# Patient Record
Sex: MALE | Race: WHITE | Employment: FULL TIME | URBAN - METROPOLITAN AREA
[De-identification: names, ages, dates, MRNs, and addresses within clinical notes are randomized per-mention and may not be internally consistent; named-entity substitution may affect disease eponyms.]

---

## 2018-08-09 ENCOUNTER — HOSPITAL ENCOUNTER (OUTPATIENT)
Dept: PREADMISSION TESTING | Age: 43
Discharge: HOME | End: 2018-08-09
Payer: COMMERCIAL

## 2018-08-09 VITALS
HEIGHT: 73 IN | WEIGHT: 210 LBS | BODY MASS INDEX: 27.83 KG/M2 | DIASTOLIC BLOOD PRESSURE: 83 MMHG | TEMPERATURE: 98 F | SYSTOLIC BLOOD PRESSURE: 154 MMHG | HEART RATE: 61 BPM

## 2018-08-09 LAB
ABO + RH BLD: NORMAL
ANION GAP SERPL CALC-SCNC: 9 MMOL/L (ref 5–15)
APPEARANCE UR: CLEAR
BACTERIA URNS QL MICRO: NEGATIVE /HPF
BILIRUB UR QL: NEGATIVE
BLOOD GROUP ANTIBODIES SERPL: NORMAL
BUN SERPL-MCNC: 15 MG/DL (ref 6–20)
BUN/CREAT SERPL: 16 (ref 12–20)
CALCIUM SERPL-MCNC: 8.6 MG/DL (ref 8.5–10.1)
CHLORIDE SERPL-SCNC: 106 MMOL/L (ref 97–108)
CO2 SERPL-SCNC: 27 MMOL/L (ref 21–32)
COLOR UR: NORMAL
CREAT SERPL-MCNC: 0.93 MG/DL (ref 0.7–1.3)
EPITH CASTS URNS QL MICRO: NORMAL /LPF
ERYTHROCYTE [DISTWIDTH] IN BLOOD BY AUTOMATED COUNT: 12.9 % (ref 11.5–14.5)
EST. AVERAGE GLUCOSE BLD GHB EST-MCNC: NORMAL MG/DL
GLUCOSE SERPL-MCNC: 62 MG/DL (ref 65–100)
GLUCOSE UR STRIP.AUTO-MCNC: NEGATIVE MG/DL
HBA1C MFR BLD: 4.8 % (ref 4.2–6.3)
HCT VFR BLD AUTO: 42.4 % (ref 36.6–50.3)
HGB BLD-MCNC: 14.2 G/DL (ref 12.1–17)
HGB UR QL STRIP: NEGATIVE
HYALINE CASTS URNS QL MICRO: NORMAL /LPF (ref 0–5)
INR PPP: 1 (ref 0.9–1.1)
KETONES UR QL STRIP.AUTO: NEGATIVE MG/DL
LEUKOCYTE ESTERASE UR QL STRIP.AUTO: NEGATIVE
MCH RBC QN AUTO: 32.5 PG (ref 26–34)
MCHC RBC AUTO-ENTMCNC: 33.5 G/DL (ref 30–36.5)
MCV RBC AUTO: 97 FL (ref 80–99)
NITRITE UR QL STRIP.AUTO: NEGATIVE
NRBC # BLD: 0 K/UL (ref 0–0.01)
NRBC BLD-RTO: 0 PER 100 WBC
PH UR STRIP: 6.5 [PH] (ref 5–8)
PLATELET # BLD AUTO: 205 K/UL (ref 150–400)
PMV BLD AUTO: 10.1 FL (ref 8.9–12.9)
POTASSIUM SERPL-SCNC: 4.3 MMOL/L (ref 3.5–5.1)
PROT UR STRIP-MCNC: NEGATIVE MG/DL
PROTHROMBIN TIME: 10.4 SEC (ref 9–11.1)
RBC # BLD AUTO: 4.37 M/UL (ref 4.1–5.7)
RBC #/AREA URNS HPF: NORMAL /HPF (ref 0–5)
SODIUM SERPL-SCNC: 142 MMOL/L (ref 136–145)
SP GR UR REFRACTOMETRY: 1.01 (ref 1–1.03)
SPECIMEN EXP DATE BLD: NORMAL
UA: UC IF INDICATED,UAUC: NORMAL
UROBILINOGEN UR QL STRIP.AUTO: 0.2 EU/DL (ref 0.2–1)
WBC # BLD AUTO: 6.1 K/UL (ref 4.1–11.1)
WBC URNS QL MICRO: NORMAL /HPF (ref 0–4)

## 2018-08-09 PROCEDURE — 85610 PROTHROMBIN TIME: CPT | Performed by: ORTHOPAEDIC SURGERY

## 2018-08-09 PROCEDURE — 85027 COMPLETE CBC AUTOMATED: CPT | Performed by: ORTHOPAEDIC SURGERY

## 2018-08-09 PROCEDURE — 86900 BLOOD TYPING SEROLOGIC ABO: CPT | Performed by: ORTHOPAEDIC SURGERY

## 2018-08-09 PROCEDURE — 81001 URINALYSIS AUTO W/SCOPE: CPT | Performed by: ORTHOPAEDIC SURGERY

## 2018-08-09 PROCEDURE — 36415 COLL VENOUS BLD VENIPUNCTURE: CPT | Performed by: ORTHOPAEDIC SURGERY

## 2018-08-09 PROCEDURE — 80048 BASIC METABOLIC PNL TOTAL CA: CPT | Performed by: ORTHOPAEDIC SURGERY

## 2018-08-09 PROCEDURE — 83036 HEMOGLOBIN GLYCOSYLATED A1C: CPT | Performed by: ORTHOPAEDIC SURGERY

## 2018-08-09 RX ORDER — IBUPROFEN 200 MG
600 TABLET ORAL
COMMUNITY
End: 2018-08-30

## 2018-08-09 RX ORDER — SALICYLIC ACID
1 POWDER (GRAM) MISCELLANEOUS
COMMUNITY

## 2018-08-09 RX ORDER — NAPROXEN SODIUM 220 MG
440 TABLET ORAL 2 TIMES DAILY WITH MEALS
COMMUNITY
End: 2018-08-30

## 2018-08-09 NOTE — PERIOP NOTES
PREOPERATIVE INSTRUCTIONS REVIEWED WITH PATIENT. PATIENT GIVEN SIX PACK OF CHG WIPES. INSTRUCTIONS TO BE REVIEWED  IN CLASS] ON USE OF CHG WIPES. PATIENT GIVEN SSI INFECTION FAQ SHEET, INFORMATION SHEET ON DIABETIC TREATMENT CENTER AS WELL AS HAND WASHING TIPS SHEETS. MRSA/MSSA TREATMENT INSTRUCTION SHEET GIVEN WITH AN EXPLANATION TO PATIENT THAT THEY WILL BE NOTIFIED IF TREATMENT INSTRUCTIONS NEED TO BE INITIATED. PATIENT WAS GIVEN THE OPPORTUNITY TO ASK QUESTIONS ON THE INFORMATION PROVIDED.

## 2018-08-10 LAB
BACTERIA SPEC CULT: ABNORMAL
BACTERIA SPEC CULT: ABNORMAL
SERVICE CMNT-IMP: ABNORMAL

## 2018-08-14 PROBLEM — Z22.321 MSSA (METHICILLIN-SUSCEPTIBLE STAPH AUREUS) CARRIER: Status: ACTIVE | Noted: 2018-08-14

## 2018-08-14 RX ORDER — MUPIROCIN 20 MG/G
OINTMENT TOPICAL 2 TIMES DAILY
Qty: 22 G | Refills: 0 | Status: SHIPPED | OUTPATIENT
Start: 2018-08-21 | End: 2018-08-30

## 2018-08-23 NOTE — H&P
Niranjan Pena  Location: [place] [address] [address]  Patient #: [id number]  : 1975   / Language: English / Race: White  Male      History of Present Illness   The patient is a 38year old male who presents to the practice today for a transition into care. Additional reasons for visit:    Hip Pain is described as the following: The onset of the hip pain has been gradual and has been occurring in an intermittent pattern for 5 years. The course has been gradually worsening. The hip pain is described as a moderate dull aching. The hip pain is described as being located in the hip (entire left hip). Note for \"Hip Pain\": Patient's chief complaint is left hip pain. 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 He is now noticing increasing pain at night. Worsening of his symptoms. Allergies   Penicillin VK *PENICILLINS*      Family History   Severe allergy   Father, Son. Social History   Alcohol use   1-2 drinks per occasion, 2 times, Drinks beer, per week, Rarely drinks more than 5 drinks per occasion. Caffeine use   3-4 drinks per day, Coffee. Current work status   Full-time. Exercise   cycling, Occasionally, Other. Marital status   . No drug use    Seat Belt Use   Always uses seat belts. Sun Exposure   Occasionally. Tobacco use   Never smoker. Medication History   No Current Medications   Medications Reconciled     Past Surgical History  Hip Fracture And Surgery   left  Vasectomy      Other Problems   Arthritis          Review of Systems  General Not Present- Appetite Loss, Chills, Fatigue, Fever, HIV Exposure, Night Sweats, Persistent Infections, Seasonal Allergies, Weight Gain and Weight Loss. Skin Not Present- Itching, Nail Changes, Poor Wound Healing, Rash, Skin Color Changes, Suspicious Lesions and Yellowish Skin Color.   HEENT Not Present- Decreased Hearing, Double Vision, Earache, Hoarseness, Jaundice/Yellow Eyes, Loose Teeth, Nose Bleed, Ringing in the Ears and Sore Throat. Respiratory Not Present- Bloody sputum, Chronic Cough, Difficulty Breathing, Snoring, Wakes up from Sleep Wheezing or Short of Breath and Wheezing. Cardiovascular Not Present- Bluish Discoloration Of Lips Or Nails, Chest Pain, Difficulty Breathing Lying Down, Difficulty Breathing On Exertion, Leg Cramps With Exertion, Palpitations and Swelling of Extremities. Gastrointestinal Not Present- Abdominal Pain, Black, Tarry Stool, Change in Bowel Habits, Cirrhosis, Constipation, Diarrhea, Difficulty Swallowing, Nausea and Vomiting. Male Genitourinary Not Present- Blood in Urine, Frequency, Painful Urination, Pelvic Pain, Trouble Starting Urinary Stream and Urgency. Musculoskeletal Present- Joint Pain. Not Present- Back Pain, Joint Stiffness and Joint Swelling. Neurological Not Present- Fainting, Headaches, Memory Loss, Numbness, Seizures, Tingling, Tremor, Unsteadiness and Weakness. Psychiatric Not Present- Anxiety, Bipolar and Depression. Endocrine Not Present- Cold Intolerance, Excessive Hunger, Excessive Thirst, Excessive Urination and Heat Intolerance. Hematology Not Present- Abnormal Bruising , Enlarged Lymph Nodes, Excessive bleeding and Skin Discoloration. Vitals   Weight: 215 lb   Height: 73 in   Body Surface Area: 2.22 m²   Body Mass Index: 28.37 kg/m²                Physical Exam   Musculoskeletal  Global Assessment  Examination of related systems reveals - well-developed, well-nourished, in no acute distress, alert and oriented x 3, no rashes or ulcers of bilateral upper and lower extremities, head or trunk and no generalized swelling or edema of extremities. Gait and Station - normal posture. Left Lower Extremity - Note: Patient walks with his left lower extremity externally rotated significantly compared to the contralateral side. His left hip is very stiff. 5° flexion contracture. Flexes to about 95°.         Assessment & Plan  Primary osteoarthritis of left hip (715.15 M16.12)  Impression: End-stage DJD left hip secondary to the previous slipped capital femoral epiphysis. Hardware is removed. Discussed everything in detail. He's had significant progression since his last office visit. I treated him in known him for at least 6 years. He is a candidate for hip replacement based on the severity of his symptoms and radiographs. Current Plans  Pt Education - How to access health information online: discussed with patient and provided information. Pt Education - Educational materials were provided.: discussed with patient and provided information. X-RAY EXAM OF HIP COMPLETE min 2 VIEWS ([id number]) (2 views left hip. End-stage DJD left hip. Bone to bone.  Screws are normal. External rotation contracture.)  REVIEW OF SYSTEMS: Systems were reviewed by the provider.(V49.9)        Signed by Sary Hughes MD

## 2018-08-27 ENCOUNTER — ANESTHESIA EVENT (OUTPATIENT)
Dept: SURGERY | Age: 43
DRG: 470 | End: 2018-08-27
Payer: COMMERCIAL

## 2018-08-28 ENCOUNTER — HOSPITAL ENCOUNTER (OUTPATIENT)
Age: 43
Setting detail: OBSERVATION
Discharge: HOME HEALTH | DRG: 470 | End: 2018-08-30
Attending: ORTHOPAEDIC SURGERY | Admitting: ORTHOPAEDIC SURGERY
Payer: COMMERCIAL

## 2018-08-28 ENCOUNTER — APPOINTMENT (OUTPATIENT)
Dept: GENERAL RADIOLOGY | Age: 43
DRG: 470 | End: 2018-08-28
Attending: PHYSICIAN ASSISTANT
Payer: COMMERCIAL

## 2018-08-28 ENCOUNTER — ANESTHESIA (OUTPATIENT)
Dept: SURGERY | Age: 43
DRG: 470 | End: 2018-08-28
Payer: COMMERCIAL

## 2018-08-28 ENCOUNTER — APPOINTMENT (OUTPATIENT)
Dept: GENERAL RADIOLOGY | Age: 43
DRG: 470 | End: 2018-08-28
Attending: ORTHOPAEDIC SURGERY
Payer: COMMERCIAL

## 2018-08-28 DIAGNOSIS — M16.12 ARTHRITIS OF LEFT HIP: Primary | ICD-10-CM

## 2018-08-28 LAB
ABO + RH BLD: NORMAL
BLOOD GROUP ANTIBODIES SERPL: NORMAL
GLUCOSE BLD STRIP.AUTO-MCNC: 99 MG/DL (ref 65–100)
SERVICE CMNT-IMP: NORMAL
SPECIMEN EXP DATE BLD: NORMAL

## 2018-08-28 PROCEDURE — 77030012935 HC DRSG AQUACEL BMS -B: Performed by: ORTHOPAEDIC SURGERY

## 2018-08-28 PROCEDURE — C9290 INJ, BUPIVACAINE LIPOSOME: HCPCS | Performed by: ORTHOPAEDIC SURGERY

## 2018-08-28 PROCEDURE — 74011250636 HC RX REV CODE- 250/636: Performed by: ANESTHESIOLOGY

## 2018-08-28 PROCEDURE — 0SRB04A REPLACEMENT OF LEFT HIP JOINT WITH CERAMIC ON POLYETHYLENE SYNTHETIC SUBSTITUTE, UNCEMENTED, OPEN APPROACH: ICD-10-PCS | Performed by: ORTHOPAEDIC SURGERY

## 2018-08-28 PROCEDURE — 74011250636 HC RX REV CODE- 250/636

## 2018-08-28 PROCEDURE — 74011250637 HC RX REV CODE- 250/637: Performed by: PHYSICIAN ASSISTANT

## 2018-08-28 PROCEDURE — 77030019908 HC STETH ESOPH SIMS -A: Performed by: ANESTHESIOLOGY

## 2018-08-28 PROCEDURE — 77030008684 HC TU ET CUF COVD -B: Performed by: ANESTHESIOLOGY

## 2018-08-28 PROCEDURE — 74011250636 HC RX REV CODE- 250/636: Performed by: PHYSICIAN ASSISTANT

## 2018-08-28 PROCEDURE — 77010033678 HC OXYGEN DAILY

## 2018-08-28 PROCEDURE — 77030034850: Performed by: ORTHOPAEDIC SURGERY

## 2018-08-28 PROCEDURE — 65270000029 HC RM PRIVATE

## 2018-08-28 PROCEDURE — 77030039267 HC ADH SKN EXOFIN S2SG -B: Performed by: ORTHOPAEDIC SURGERY

## 2018-08-28 PROCEDURE — 77030020365 HC SOL INJ SOD CL 0.9% 50ML: Performed by: ORTHOPAEDIC SURGERY

## 2018-08-28 PROCEDURE — 73501 X-RAY EXAM HIP UNI 1 VIEW: CPT

## 2018-08-28 PROCEDURE — 97530 THERAPEUTIC ACTIVITIES: CPT

## 2018-08-28 PROCEDURE — 77030006822 HC BLD SAW SAG BRSM -B: Performed by: ORTHOPAEDIC SURGERY

## 2018-08-28 PROCEDURE — 77030032490 HC SLV COMPR SCD KNE COVD -B

## 2018-08-28 PROCEDURE — C1776 JOINT DEVICE (IMPLANTABLE): HCPCS | Performed by: ORTHOPAEDIC SURGERY

## 2018-08-28 PROCEDURE — 82962 GLUCOSE BLOOD TEST: CPT

## 2018-08-28 PROCEDURE — 77030026438 HC STYL ET INTUB CARD -A: Performed by: ANESTHESIOLOGY

## 2018-08-28 PROCEDURE — 74011000258 HC RX REV CODE- 258

## 2018-08-28 PROCEDURE — 77030013079 HC BLNKT BAIR HGGR 3M -A: Performed by: ANESTHESIOLOGY

## 2018-08-28 PROCEDURE — 74011250636 HC RX REV CODE- 250/636: Performed by: ORTHOPAEDIC SURGERY

## 2018-08-28 PROCEDURE — 76010000172 HC OR TIME 2.5 TO 3 HR INTENSV-TIER 1: Performed by: ORTHOPAEDIC SURGERY

## 2018-08-28 PROCEDURE — 99218 HC RM OBSERVATION: CPT

## 2018-08-28 PROCEDURE — P9045 ALBUMIN (HUMAN), 5%, 250 ML: HCPCS

## 2018-08-28 PROCEDURE — 74011000250 HC RX REV CODE- 250: Performed by: ORTHOPAEDIC SURGERY

## 2018-08-28 PROCEDURE — 74011250637 HC RX REV CODE- 250/637: Performed by: ORTHOPAEDIC SURGERY

## 2018-08-28 PROCEDURE — 74011000250 HC RX REV CODE- 250

## 2018-08-28 PROCEDURE — 77030018836 HC SOL IRR NACL ICUM -A: Performed by: ORTHOPAEDIC SURGERY

## 2018-08-28 PROCEDURE — 94760 N-INVAS EAR/PLS OXIMETRY 1: CPT

## 2018-08-28 PROCEDURE — 77030002933 HC SUT MCRYL J&J -A: Performed by: ORTHOPAEDIC SURGERY

## 2018-08-28 PROCEDURE — G8978 MOBILITY CURRENT STATUS: HCPCS

## 2018-08-28 PROCEDURE — 76060000036 HC ANESTHESIA 2.5 TO 3 HR: Performed by: ORTHOPAEDIC SURGERY

## 2018-08-28 PROCEDURE — 76210000016 HC OR PH I REC 1 TO 1.5 HR: Performed by: ORTHOPAEDIC SURGERY

## 2018-08-28 PROCEDURE — 77030038020 HC MANFLD NEPTUNE STRY -B: Performed by: ORTHOPAEDIC SURGERY

## 2018-08-28 PROCEDURE — 77030020782 HC GWN BAIR PAWS FLX 3M -B

## 2018-08-28 PROCEDURE — 77030020061 HC IV BLD WRMR ADMIN SET 3M -B: Performed by: ANESTHESIOLOGY

## 2018-08-28 PROCEDURE — 77030020788: Performed by: ORTHOPAEDIC SURGERY

## 2018-08-28 PROCEDURE — 86900 BLOOD TYPING SEROLOGIC ABO: CPT | Performed by: ORTHOPAEDIC SURGERY

## 2018-08-28 PROCEDURE — G8979 MOBILITY GOAL STATUS: HCPCS

## 2018-08-28 PROCEDURE — 77030011640 HC PAD GRND REM COVD -A: Performed by: ORTHOPAEDIC SURGERY

## 2018-08-28 PROCEDURE — 36415 COLL VENOUS BLD VENIPUNCTURE: CPT | Performed by: ORTHOPAEDIC SURGERY

## 2018-08-28 PROCEDURE — 77030031139 HC SUT VCRL2 J&J -A: Performed by: ORTHOPAEDIC SURGERY

## 2018-08-28 PROCEDURE — 76000 FLUOROSCOPY <1 HR PHYS/QHP: CPT

## 2018-08-28 PROCEDURE — 77030018846 HC SOL IRR STRL H20 ICUM -A: Performed by: ORTHOPAEDIC SURGERY

## 2018-08-28 PROCEDURE — 97161 PT EVAL LOW COMPLEX 20 MIN: CPT

## 2018-08-28 DEVICE — APEX HOLE ELIMINATOR - PS
Type: IMPLANTABLE DEVICE | Site: HIP | Status: FUNCTIONAL
Brand: APEX

## 2018-08-28 DEVICE — PINNACLE HIP SOLUTIONS ALTRX POLYETHYLENE ACETABULAR LINER NEUTRAL 36MM ID 54MM OD
Type: IMPLANTABLE DEVICE | Site: HIP | Status: FUNCTIONAL
Brand: PINNACLE ALTRX

## 2018-08-28 DEVICE — PINNACLE CANCELLOUS BONE SCREW 6.5MM X 20MM
Type: IMPLANTABLE DEVICE | Site: HIP | Status: FUNCTIONAL
Brand: PINNACLE

## 2018-08-28 DEVICE — PINNACLE CANCELLOUS BONE SCREW 6.5MM X 55MM
Type: IMPLANTABLE DEVICE | Site: HIP | Status: FUNCTIONAL
Brand: PINNACLE

## 2018-08-28 DEVICE — BIOLOX DELTA CERAMIC FEMORAL HEAD +8.5 36MM DIA 12/14 TAPER
Type: IMPLANTABLE DEVICE | Site: HIP | Status: FUNCTIONAL
Brand: BIOLOX DELTA

## 2018-08-28 DEVICE — COMPONENT TOT HIP PRIMARY CERM ALTRX: Type: IMPLANTABLE DEVICE | Site: HIP | Status: FUNCTIONAL

## 2018-08-28 DEVICE — ACTIS DUOFIX HIP PROSTHESIS (FEMORAL STEM 12/14 TAPER CEMENTLESS SIZE 6 STD COLLAR)  CE
Type: IMPLANTABLE DEVICE | Site: HIP | Status: FUNCTIONAL
Brand: ACTIS

## 2018-08-28 DEVICE — PINNACLE GRIPTION ACETABULAR SHELL SECTOR 54MM OD
Type: IMPLANTABLE DEVICE | Site: HIP | Status: FUNCTIONAL
Brand: PINNACLE GRIPTION

## 2018-08-28 RX ORDER — MIDAZOLAM HYDROCHLORIDE 1 MG/ML
1 INJECTION, SOLUTION INTRAMUSCULAR; INTRAVENOUS AS NEEDED
Status: DISCONTINUED | OUTPATIENT
Start: 2018-08-28 | End: 2018-08-28 | Stop reason: HOSPADM

## 2018-08-28 RX ORDER — NALOXONE HYDROCHLORIDE 0.4 MG/ML
0.4 INJECTION, SOLUTION INTRAMUSCULAR; INTRAVENOUS; SUBCUTANEOUS AS NEEDED
Status: DISCONTINUED | OUTPATIENT
Start: 2018-08-28 | End: 2018-08-30 | Stop reason: HOSPADM

## 2018-08-28 RX ORDER — FENTANYL CITRATE 50 UG/ML
50 INJECTION, SOLUTION INTRAMUSCULAR; INTRAVENOUS AS NEEDED
Status: DISCONTINUED | OUTPATIENT
Start: 2018-08-28 | End: 2018-08-28 | Stop reason: HOSPADM

## 2018-08-28 RX ORDER — CEFAZOLIN SODIUM/WATER 2 G/20 ML
2 SYRINGE (ML) INTRAVENOUS EVERY 8 HOURS
Status: COMPLETED | OUTPATIENT
Start: 2018-08-28 | End: 2018-08-29

## 2018-08-28 RX ORDER — PHENYLEPHRINE HCL IN 0.9% NACL 0.4MG/10ML
SYRINGE (ML) INTRAVENOUS AS NEEDED
Status: DISCONTINUED | OUTPATIENT
Start: 2018-08-28 | End: 2018-08-28 | Stop reason: HOSPADM

## 2018-08-28 RX ORDER — POLYETHYLENE GLYCOL 3350 17 G/17G
17 POWDER, FOR SOLUTION ORAL DAILY
Status: DISCONTINUED | OUTPATIENT
Start: 2018-08-29 | End: 2018-08-30 | Stop reason: HOSPADM

## 2018-08-28 RX ORDER — ONDANSETRON 2 MG/ML
INJECTION INTRAMUSCULAR; INTRAVENOUS AS NEEDED
Status: DISCONTINUED | OUTPATIENT
Start: 2018-08-28 | End: 2018-08-28 | Stop reason: HOSPADM

## 2018-08-28 RX ORDER — TRAMADOL HYDROCHLORIDE 50 MG/1
50 TABLET ORAL
Status: DISCONTINUED | OUTPATIENT
Start: 2018-08-28 | End: 2018-08-30 | Stop reason: HOSPADM

## 2018-08-28 RX ORDER — SODIUM CHLORIDE, SODIUM LACTATE, POTASSIUM CHLORIDE, CALCIUM CHLORIDE 600; 310; 30; 20 MG/100ML; MG/100ML; MG/100ML; MG/100ML
100 INJECTION, SOLUTION INTRAVENOUS CONTINUOUS
Status: DISCONTINUED | OUTPATIENT
Start: 2018-08-28 | End: 2018-08-28 | Stop reason: HOSPADM

## 2018-08-28 RX ORDER — ROCURONIUM BROMIDE 10 MG/ML
INJECTION, SOLUTION INTRAVENOUS AS NEEDED
Status: DISCONTINUED | OUTPATIENT
Start: 2018-08-28 | End: 2018-08-28 | Stop reason: HOSPADM

## 2018-08-28 RX ORDER — PROPOFOL 10 MG/ML
INJECTION, EMULSION INTRAVENOUS AS NEEDED
Status: DISCONTINUED | OUTPATIENT
Start: 2018-08-28 | End: 2018-08-28 | Stop reason: HOSPADM

## 2018-08-28 RX ORDER — SODIUM CHLORIDE 0.9 % (FLUSH) 0.9 %
5-10 SYRINGE (ML) INJECTION AS NEEDED
Status: DISCONTINUED | OUTPATIENT
Start: 2018-08-28 | End: 2018-08-28 | Stop reason: HOSPADM

## 2018-08-28 RX ORDER — DEXAMETHASONE SODIUM PHOSPHATE 4 MG/ML
INJECTION, SOLUTION INTRA-ARTICULAR; INTRALESIONAL; INTRAMUSCULAR; INTRAVENOUS; SOFT TISSUE AS NEEDED
Status: DISCONTINUED | OUTPATIENT
Start: 2018-08-28 | End: 2018-08-28 | Stop reason: HOSPADM

## 2018-08-28 RX ORDER — ROPIVACAINE HYDROCHLORIDE 5 MG/ML
30 INJECTION, SOLUTION EPIDURAL; INFILTRATION; PERINEURAL AS NEEDED
Status: DISCONTINUED | OUTPATIENT
Start: 2018-08-28 | End: 2018-08-28 | Stop reason: HOSPADM

## 2018-08-28 RX ORDER — BUPIVACAINE HYDROCHLORIDE 5 MG/ML
INJECTION, SOLUTION EPIDURAL; INTRACAUDAL AS NEEDED
Status: DISCONTINUED | OUTPATIENT
Start: 2018-08-28 | End: 2018-08-28 | Stop reason: HOSPADM

## 2018-08-28 RX ORDER — SODIUM CHLORIDE 0.9 % (FLUSH) 0.9 %
5-10 SYRINGE (ML) INJECTION EVERY 8 HOURS
Status: DISCONTINUED | OUTPATIENT
Start: 2018-08-29 | End: 2018-08-30 | Stop reason: HOSPADM

## 2018-08-28 RX ORDER — FENTANYL CITRATE 50 UG/ML
25 INJECTION, SOLUTION INTRAMUSCULAR; INTRAVENOUS
Status: COMPLETED | OUTPATIENT
Start: 2018-08-28 | End: 2018-08-28

## 2018-08-28 RX ORDER — ACETAMINOPHEN 500 MG
1000 TABLET ORAL EVERY 6 HOURS
Status: DISCONTINUED | OUTPATIENT
Start: 2018-08-28 | End: 2018-08-30 | Stop reason: HOSPADM

## 2018-08-28 RX ORDER — SODIUM CHLORIDE, SODIUM LACTATE, POTASSIUM CHLORIDE, CALCIUM CHLORIDE 600; 310; 30; 20 MG/100ML; MG/100ML; MG/100ML; MG/100ML
25 INJECTION, SOLUTION INTRAVENOUS CONTINUOUS
Status: DISCONTINUED | OUTPATIENT
Start: 2018-08-28 | End: 2018-08-28 | Stop reason: HOSPADM

## 2018-08-28 RX ORDER — NEOSTIGMINE METHYLSULFATE 1 MG/ML
INJECTION INTRAVENOUS AS NEEDED
Status: DISCONTINUED | OUTPATIENT
Start: 2018-08-28 | End: 2018-08-28 | Stop reason: HOSPADM

## 2018-08-28 RX ORDER — SODIUM CHLORIDE 9 MG/ML
25 INJECTION, SOLUTION INTRAVENOUS CONTINUOUS
Status: DISCONTINUED | OUTPATIENT
Start: 2018-08-28 | End: 2018-08-28 | Stop reason: HOSPADM

## 2018-08-28 RX ORDER — SODIUM CHLORIDE 9 MG/ML
125 INJECTION, SOLUTION INTRAVENOUS CONTINUOUS
Status: DISPENSED | OUTPATIENT
Start: 2018-08-28 | End: 2018-08-29

## 2018-08-28 RX ORDER — KETAMINE HYDROCHLORIDE 10 MG/ML
INJECTION, SOLUTION INTRAMUSCULAR; INTRAVENOUS AS NEEDED
Status: DISCONTINUED | OUTPATIENT
Start: 2018-08-28 | End: 2018-08-28 | Stop reason: HOSPADM

## 2018-08-28 RX ORDER — SODIUM CHLORIDE 0.9 % (FLUSH) 0.9 %
5-10 SYRINGE (ML) INJECTION EVERY 8 HOURS
Status: DISCONTINUED | OUTPATIENT
Start: 2018-08-28 | End: 2018-08-28 | Stop reason: HOSPADM

## 2018-08-28 RX ORDER — LIDOCAINE HYDROCHLORIDE 20 MG/ML
INJECTION, SOLUTION EPIDURAL; INFILTRATION; INTRACAUDAL; PERINEURAL AS NEEDED
Status: DISCONTINUED | OUTPATIENT
Start: 2018-08-28 | End: 2018-08-28 | Stop reason: HOSPADM

## 2018-08-28 RX ORDER — OXYCODONE HYDROCHLORIDE 5 MG/1
5 TABLET ORAL AS NEEDED
Status: DISCONTINUED | OUTPATIENT
Start: 2018-08-28 | End: 2018-08-28 | Stop reason: HOSPADM

## 2018-08-28 RX ORDER — OXYCODONE HYDROCHLORIDE 5 MG/1
5-10 TABLET ORAL
Status: DISCONTINUED | OUTPATIENT
Start: 2018-08-28 | End: 2018-08-29

## 2018-08-28 RX ORDER — ALBUMIN HUMAN 50 G/1000ML
SOLUTION INTRAVENOUS AS NEEDED
Status: DISCONTINUED | OUTPATIENT
Start: 2018-08-28 | End: 2018-08-28 | Stop reason: HOSPADM

## 2018-08-28 RX ORDER — MORPHINE SULFATE 10 MG/ML
2 INJECTION, SOLUTION INTRAMUSCULAR; INTRAVENOUS
Status: DISCONTINUED | OUTPATIENT
Start: 2018-08-28 | End: 2018-08-28 | Stop reason: HOSPADM

## 2018-08-28 RX ORDER — SODIUM CHLORIDE 0.9 % (FLUSH) 0.9 %
5-10 SYRINGE (ML) INJECTION AS NEEDED
Status: DISCONTINUED | OUTPATIENT
Start: 2018-08-28 | End: 2018-08-30 | Stop reason: HOSPADM

## 2018-08-28 RX ORDER — GLYCOPYRROLATE 0.2 MG/ML
INJECTION INTRAMUSCULAR; INTRAVENOUS AS NEEDED
Status: DISCONTINUED | OUTPATIENT
Start: 2018-08-28 | End: 2018-08-28 | Stop reason: HOSPADM

## 2018-08-28 RX ORDER — LIDOCAINE HYDROCHLORIDE 10 MG/ML
0.1 INJECTION, SOLUTION EPIDURAL; INFILTRATION; INTRACAUDAL; PERINEURAL AS NEEDED
Status: DISCONTINUED | OUTPATIENT
Start: 2018-08-28 | End: 2018-08-28 | Stop reason: HOSPADM

## 2018-08-28 RX ORDER — CELECOXIB 200 MG/1
200 CAPSULE ORAL 2 TIMES DAILY
Status: DISCONTINUED | OUTPATIENT
Start: 2018-08-28 | End: 2018-08-30 | Stop reason: HOSPADM

## 2018-08-28 RX ORDER — MUPIROCIN 20 MG/G
OINTMENT TOPICAL 2 TIMES DAILY
Status: DISCONTINUED | OUTPATIENT
Start: 2018-08-28 | End: 2018-08-28

## 2018-08-28 RX ORDER — CELECOXIB 200 MG/1
200 CAPSULE ORAL ONCE
Status: COMPLETED | OUTPATIENT
Start: 2018-08-28 | End: 2018-08-28

## 2018-08-28 RX ORDER — HYDROMORPHONE HYDROCHLORIDE 2 MG/ML
INJECTION, SOLUTION INTRAMUSCULAR; INTRAVENOUS; SUBCUTANEOUS AS NEEDED
Status: DISCONTINUED | OUTPATIENT
Start: 2018-08-28 | End: 2018-08-28 | Stop reason: HOSPADM

## 2018-08-28 RX ORDER — ACETAMINOPHEN 500 MG
1000 TABLET ORAL ONCE
Status: COMPLETED | OUTPATIENT
Start: 2018-08-28 | End: 2018-08-28

## 2018-08-28 RX ORDER — FENTANYL CITRATE 50 UG/ML
10 INJECTION, SOLUTION INTRAMUSCULAR; INTRAVENOUS
Status: DISCONTINUED | OUTPATIENT
Start: 2018-08-28 | End: 2018-08-30 | Stop reason: HOSPADM

## 2018-08-28 RX ORDER — FACIAL-BODY WIPES
10 EACH TOPICAL DAILY PRN
Status: DISCONTINUED | OUTPATIENT
Start: 2018-08-30 | End: 2018-08-30 | Stop reason: HOSPADM

## 2018-08-28 RX ORDER — CEFAZOLIN SODIUM/WATER 2 G/20 ML
2 SYRINGE (ML) INTRAVENOUS ONCE
Status: COMPLETED | OUTPATIENT
Start: 2018-08-28 | End: 2018-08-28

## 2018-08-28 RX ORDER — DIPHENHYDRAMINE HYDROCHLORIDE 50 MG/ML
12.5 INJECTION, SOLUTION INTRAMUSCULAR; INTRAVENOUS
Status: ACTIVE | OUTPATIENT
Start: 2018-08-28 | End: 2018-08-29

## 2018-08-28 RX ORDER — MIDAZOLAM HYDROCHLORIDE 1 MG/ML
INJECTION, SOLUTION INTRAMUSCULAR; INTRAVENOUS AS NEEDED
Status: DISCONTINUED | OUTPATIENT
Start: 2018-08-28 | End: 2018-08-28 | Stop reason: HOSPADM

## 2018-08-28 RX ORDER — ONDANSETRON 2 MG/ML
4 INJECTION INTRAMUSCULAR; INTRAVENOUS AS NEEDED
Status: DISCONTINUED | OUTPATIENT
Start: 2018-08-28 | End: 2018-08-28 | Stop reason: HOSPADM

## 2018-08-28 RX ORDER — AMOXICILLIN 250 MG
1 CAPSULE ORAL 2 TIMES DAILY
Status: DISCONTINUED | OUTPATIENT
Start: 2018-08-28 | End: 2018-08-30 | Stop reason: HOSPADM

## 2018-08-28 RX ORDER — ASPIRIN 325 MG
325 TABLET, DELAYED RELEASE (ENTERIC COATED) ORAL 2 TIMES DAILY
Status: DISCONTINUED | OUTPATIENT
Start: 2018-08-28 | End: 2018-08-30 | Stop reason: HOSPADM

## 2018-08-28 RX ORDER — EPHEDRINE SULFATE 50 MG/ML
INJECTION, SOLUTION INTRAVENOUS AS NEEDED
Status: DISCONTINUED | OUTPATIENT
Start: 2018-08-28 | End: 2018-08-28 | Stop reason: HOSPADM

## 2018-08-28 RX ORDER — SUCCINYLCHOLINE CHLORIDE 20 MG/ML
INJECTION INTRAMUSCULAR; INTRAVENOUS AS NEEDED
Status: DISCONTINUED | OUTPATIENT
Start: 2018-08-28 | End: 2018-08-28 | Stop reason: HOSPADM

## 2018-08-28 RX ORDER — MIDAZOLAM HYDROCHLORIDE 1 MG/ML
0.5 INJECTION, SOLUTION INTRAMUSCULAR; INTRAVENOUS
Status: DISCONTINUED | OUTPATIENT
Start: 2018-08-28 | End: 2018-08-28 | Stop reason: HOSPADM

## 2018-08-28 RX ORDER — ONDANSETRON 2 MG/ML
4 INJECTION INTRAMUSCULAR; INTRAVENOUS
Status: ACTIVE | OUTPATIENT
Start: 2018-08-28 | End: 2018-08-29

## 2018-08-28 RX ORDER — DIPHENHYDRAMINE HYDROCHLORIDE 50 MG/ML
12.5 INJECTION, SOLUTION INTRAMUSCULAR; INTRAVENOUS AS NEEDED
Status: DISCONTINUED | OUTPATIENT
Start: 2018-08-28 | End: 2018-08-28 | Stop reason: HOSPADM

## 2018-08-28 RX ORDER — ACETAMINOPHEN 325 MG/1
650 TABLET ORAL
COMMUNITY

## 2018-08-28 RX ADMIN — Medication 80 MCG: at 09:25

## 2018-08-28 RX ADMIN — EPHEDRINE SULFATE 10 MG: 50 INJECTION, SOLUTION INTRAVENOUS at 10:47

## 2018-08-28 RX ADMIN — MIDAZOLAM 0.5 MG: 1 INJECTION INTRAMUSCULAR; INTRAVENOUS at 12:24

## 2018-08-28 RX ADMIN — Medication 1 TABLET: at 17:01

## 2018-08-28 RX ADMIN — SODIUM CHLORIDE 125 ML/HR: 900 INJECTION, SOLUTION INTRAVENOUS at 13:08

## 2018-08-28 RX ADMIN — KETAMINE HYDROCHLORIDE 30 MG: 10 INJECTION, SOLUTION INTRAMUSCULAR; INTRAVENOUS at 09:36

## 2018-08-28 RX ADMIN — SODIUM CHLORIDE 125 ML/HR: 900 INJECTION, SOLUTION INTRAVENOUS at 20:24

## 2018-08-28 RX ADMIN — ASPIRIN 325 MG: 325 TABLET, DELAYED RELEASE ORAL at 17:01

## 2018-08-28 RX ADMIN — Medication 120 MCG: at 09:55

## 2018-08-28 RX ADMIN — FENTANYL CITRATE 25 MCG: 50 INJECTION, SOLUTION INTRAMUSCULAR; INTRAVENOUS at 12:24

## 2018-08-28 RX ADMIN — EPHEDRINE SULFATE 25 MG: 50 INJECTION, SOLUTION INTRAVENOUS at 10:00

## 2018-08-28 RX ADMIN — MIDAZOLAM HYDROCHLORIDE 2 MG: 1 INJECTION, SOLUTION INTRAMUSCULAR; INTRAVENOUS at 09:02

## 2018-08-28 RX ADMIN — FENTANYL CITRATE 25 MCG: 50 INJECTION, SOLUTION INTRAMUSCULAR; INTRAVENOUS at 13:15

## 2018-08-28 RX ADMIN — ROCURONIUM BROMIDE 40 MG: 10 INJECTION, SOLUTION INTRAVENOUS at 09:20

## 2018-08-28 RX ADMIN — HYDROMORPHONE HYDROCHLORIDE 1 MG: 2 INJECTION, SOLUTION INTRAMUSCULAR; INTRAVENOUS; SUBCUTANEOUS at 12:03

## 2018-08-28 RX ADMIN — PROPOFOL 200 MG: 10 INJECTION, EMULSION INTRAVENOUS at 09:11

## 2018-08-28 RX ADMIN — FENTANYL CITRATE 25 MCG: 50 INJECTION, SOLUTION INTRAMUSCULAR; INTRAVENOUS at 13:45

## 2018-08-28 RX ADMIN — SUCCINYLCHOLINE CHLORIDE 140 MG: 20 INJECTION INTRAMUSCULAR; INTRAVENOUS at 09:11

## 2018-08-28 RX ADMIN — CELECOXIB 200 MG: 200 CAPSULE ORAL at 17:01

## 2018-08-28 RX ADMIN — GLYCOPYRROLATE 0.2 MG: 0.2 INJECTION INTRAMUSCULAR; INTRAVENOUS at 09:54

## 2018-08-28 RX ADMIN — CELECOXIB 200 MG: 200 CAPSULE ORAL at 07:58

## 2018-08-28 RX ADMIN — NEOSTIGMINE METHYLSULFATE 4 MG: 1 INJECTION INTRAVENOUS at 11:22

## 2018-08-28 RX ADMIN — GLYCOPYRROLATE 0.5 MG: 0.2 INJECTION INTRAMUSCULAR; INTRAVENOUS at 11:22

## 2018-08-28 RX ADMIN — ALBUMIN HUMAN 250 ML: 50 SOLUTION INTRAVENOUS at 10:08

## 2018-08-28 RX ADMIN — Medication 2 G: at 17:35

## 2018-08-28 RX ADMIN — LIDOCAINE HYDROCHLORIDE 100 MG: 20 INJECTION, SOLUTION EPIDURAL; INFILTRATION; INTRACAUDAL; PERINEURAL at 09:11

## 2018-08-28 RX ADMIN — SODIUM CHLORIDE, SODIUM LACTATE, POTASSIUM CHLORIDE, AND CALCIUM CHLORIDE: 600; 310; 30; 20 INJECTION, SOLUTION INTRAVENOUS at 10:37

## 2018-08-28 RX ADMIN — ROCURONIUM BROMIDE 30 MG: 10 INJECTION, SOLUTION INTRAVENOUS at 09:52

## 2018-08-28 RX ADMIN — ONDANSETRON 4 MG: 2 INJECTION INTRAMUSCULAR; INTRAVENOUS at 11:15

## 2018-08-28 RX ADMIN — NEOSTIGMINE METHYLSULFATE 1 MG: 1 INJECTION INTRAVENOUS at 11:24

## 2018-08-28 RX ADMIN — ROCURONIUM BROMIDE 20 MG: 10 INJECTION, SOLUTION INTRAVENOUS at 10:33

## 2018-08-28 RX ADMIN — ACETAMINOPHEN 500 MG TABLET 1000 MG: at 17:01

## 2018-08-28 RX ADMIN — Medication 2 G: at 09:21

## 2018-08-28 RX ADMIN — EPHEDRINE SULFATE 10 MG: 50 INJECTION, SOLUTION INTRAVENOUS at 10:06

## 2018-08-28 RX ADMIN — ROCURONIUM BROMIDE 10 MG: 10 INJECTION, SOLUTION INTRAVENOUS at 09:11

## 2018-08-28 RX ADMIN — SODIUM CHLORIDE, SODIUM LACTATE, POTASSIUM CHLORIDE, AND CALCIUM CHLORIDE 25 ML/HR: 600; 310; 30; 20 INJECTION, SOLUTION INTRAVENOUS at 07:52

## 2018-08-28 RX ADMIN — OXYCODONE HYDROCHLORIDE 5 MG: 5 TABLET ORAL at 21:43

## 2018-08-28 RX ADMIN — ACETAMINOPHEN 1000 MG: 500 TABLET ORAL at 07:58

## 2018-08-28 RX ADMIN — DEXAMETHASONE SODIUM PHOSPHATE 4 MG: 4 INJECTION, SOLUTION INTRA-ARTICULAR; INTRALESIONAL; INTRAMUSCULAR; INTRAVENOUS; SOFT TISSUE at 09:19

## 2018-08-28 RX ADMIN — FENTANYL CITRATE 25 MCG: 50 INJECTION, SOLUTION INTRAMUSCULAR; INTRAVENOUS at 13:00

## 2018-08-28 RX ADMIN — OXYCODONE HYDROCHLORIDE 5 MG: 5 TABLET ORAL at 16:57

## 2018-08-28 RX ADMIN — HYDROMORPHONE HYDROCHLORIDE 1 MG: 2 INJECTION, SOLUTION INTRAMUSCULAR; INTRAVENOUS; SUBCUTANEOUS at 09:48

## 2018-08-28 NOTE — IP AVS SNAPSHOT
Summary of Care Report The Summary of Care report has been created to help improve care coordination. Users with access to [employer] or [address] (Web-based application) may access additional patient information including the Discharge Summary. If you are not currently a [address] user and need more information, please call the number listed below in the [place] [address] section and ask to be connected with Medical Records. Facility Information Name Address Phone [address] [facility] [address] [phone] Patient Information Patient Name Sex  Biju Mims ([id number]) Male 1975 Discharge Information Admitting Provider Service Area Unit Ramy Aguilar MD / [address]  / [phone] Discharge Provider Discharge Date/Time Discharge Disposition Destination (none) 2018 (Pending) [facility] (none) Patient Language Language ENGLISH [13] Hospital Problems as of 2018  Reviewed: 2018  7:19 AM by Angeles Her MD  
  
  
  
 Class Noted - Resolved Last Modified POA Active Problems * (Principal)Arthritis of left hip  2018 - Present 2018 by Khurram Alejo NP Yes Entered by Ramy Aguilar MD  
  Overview Signed 2018  2:34 PM by Khurram Alejo NP Previous history of SCFE. Non-Hospital Problems as of 2018  Reviewed: 2018  7:19 AM by Angeles Her MD  
  
  
  
 Class Noted - Resolved Last Modified Active Problems Abnormal ECG  10/16/2012 - Present 10/16/2012 by Vitaly Peng MD  
  Entered by Vitaly Peng MD  
  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  
  
START taking these medications Dose & Instructions Dispensing Information Comments  
 aspirin delayed-release 325 mg tablet Dose:  325 mg Take 1 Tab by mouth two (2) times a day. Quantity:  1 Tab Refills:  0  
   
 oxyCODONE IR 5 mg immediate release tablet Commonly known as:  Lorenzo Au Dose:  5-10 mg Take 1-2 Tabs by mouth every four (4) hours as needed. Max Daily Amount: 60 mg.  
 Quantity:  80 Tab Refills:  0 CONTINUE these medications which have NOT CHANGED Dose & Instructions Dispensing Information Comments  
 augmented betamethasone dipropionate 0.05 % topical cream  
Commonly known as:  Mason Alba Apply  to affected area two (2) times a day. Quantity:  15 g Refills:  0 GLUCOSAMINE-CHONDROITIN PO Dose:  2250 mg Take 2,250 mg by mouth daily. Refills:  0 Lavender Oil Oil Dose:  1 Drop 1 Drop by Does Not Apply route nightly. Refills:  0  
   
 M-VIT PO Take  by mouth daily. Refills:  0  
   
 OTHER Dose:  1 Drop 1 Drop nightly. VALOR OIL Refills:  0  
   
 TYLENOL 325 mg tablet Generic drug:  acetaminophen Dose:  650 mg Take 650 mg by mouth every four (4) hours as needed for Pain. Refills:  0 STOP taking these medications Comments ADVIL 200 mg tablet Generic drug:  ibuprofen ADVIL PM PO  
   
   
 ALEVE 220 mg tablet Generic drug:  naproxen sodium  
   
   
 mupirocin 2 % ointment Commonly known as:  [facility] Information ID Date/Time Status Primary Surgeon All Procedures Location [id number] 8/28/2018 0900 Posted Brenda Beckman MD LEFT TOTAL HIP ARTHROPLASTY ANTERIOR APPROACH [facility] MAIN OR Follow-up Information Follow up With Details Comments Contact Info  None   None ([phone]) Patient stated that they have no PCP 
  
 AT HOME CARE  For home health physical therapy. [address] [place] [id number] 
[phone] Discharge Instructions Discharge Instructions Anterior Approach Hip Replacement Dr. Kennedy Villasenor Patient Name:Berekte Pena Date of procedure:8/28/2018 Procedure:Procedure(s): LEFT TOTAL HIP ARTHROPLASTY ANTERIOR APPROACH Surgeon:Surgeon(s) and Role: Weston Gamez MD - Primary PCP: None Date of discharge: No discharge date for patient encounter. Follow up appointments ? Follow up with Dr. Kennedy Villasenor in 3 weeks. Call [phone] to make an appointment. ? If home health has been arranged for you the agency will contact you to arrange dates/times for visits. Please call them if you do not hear from them within 24 hours after you are discharged When to call your Orthopaedic Surgeon: Call [phone]. If you need to reach us after 5pm or on a weekend; call [phone] and the on call physician will be contacted ? Increased hip pain, unrelieved pain or if you have difficulty or are unable to walk ? Signs of infection-if your incision is red; continues to have drainage; drainage has a foul odor or if you have a persistent fever over 101 degrees ? Signs of a blood clot in your leg-calf pain, tenderness, redness, swelling of lower leg When to call your Primary Care Physician: 
? Concerns about medical conditions such as diabetes, high blood pressure, asthma, congestive heart failure ? Call if blood sugars are elevated, persistent headache or dizziness, coughing or congestion, constipation or diarrhea, burning with urination, abnormal heart rate When to call [id number] go to the nearest emergency room ? Acute onset of chest pain, shortness of breath, difficulty breathing Activity ? Weight bearing as tolerated with walker or crutches. Refer to pages 23-33 of your handbook for instructions and pictures ? Complete your Home Exercise Program daily as instructed by your therapist.  Refer to pages 36-42 of your handbook for instructions and pictures ? Get up every one hour and walk (except at night when sleeping) ? AVOID sudden and extreme movement of your hip (surgical leg) ? Do not drive or operate heavy machinery Incision Care ? The Aquacel (brown, waterproof) surgical dressing is to remain on your hip for 7 days. On the 7th day have someone gently peel the dressing off by carefully lifting the edge and stretching it slightly to break the adhesive seal and leave incision open to air. You may take a shower with the Aquacel dressing in place. ? You do not have staples in your hip incision; if present they will be removed by the Home Health staff in 10-14 days and steri-strips will be applied. Leave the steri-strips on until they fall off Preventing blood clot ? Take Aspirin as prescribed Pain management ? Take pain medication as prescribed; decrease the amount you use as your pain lessens ? Avoid alcoholic beverages while taking pain medication ? Do not take any over-the-counter medication except for Tylenol (acetaminophen) ? Please be aware that many medications contain Tylenol. We do not want you to over medicate so please read the information below as a guide. Do not take more than 3 Grams of Tylenol in a 24 hour period. (There are 1000 milligrams in one Gram) 
o 325 mg of Tylenol per tablet (do not take more than 9 tablets in 24 hours) 
o 500 mg of Tylenol per tablet (do not take more than 6 tablets in 24 hours) ? You may place an ice bag on your hip for 15-20 minutes after exercising and as needed throughout the day and night Diet Resume usual diet; drink plenty of fluids; eat foods high in fiber You may want to take a stool softener (such as Senokot-S or Colace) to prevent constipation while you are taking pain medication.   If constipation occurs, take a laxative (such as Dulcolax tablets, Milk of Magnesia, or a suppository) Chart Review Routing History No Routing History on File

## 2018-08-28 NOTE — H&P
Date of Surgery Update:  Maciej Fox was seen and examined. History and physical has been reviewed. The patient has been examined.  There have been no significant clinical changes since the completion of the originally dated History and Physical.    Signed By: Berneta Eisenmenger, MD     August 28, 2018 7:26 AM

## 2018-08-28 NOTE — PERIOP NOTES
TRANSFER - OUT REPORT: 
 
Verbal report given to Abdias Burrell RN on Jus Frazier  being transferred to 577(unit) for routine post - op Report consisted of patients Situation, Background, Assessment and  
Recommendations(SBAR). Time Pre op antibiotic VKLNP:7696 Anesthesia Stop time: 3355 Tate Present on Transfer to floor:y Order for Tate on Chart:y 
Discharge Prescriptions with Chart:n Information from the following report(s) SBAR, OR Summary, Intake/Output, MAR and Cardiac Rhythm NSR was reviewed with the receiving nurse. Opportunity for questions and clarification was provided. Is the patient on 02? Y 
 2L/Min Other Is the patient on a monitor? NO Is the nurse transporting with the patient? NO Surgical Waiting Area notified of patient's transfer from PACU? YES, at 1400 via volunteer [id number] [facility] [place] [facility] [place] The following personal items collected during your admission accompanied patient upon transfer:  
Dental Appliance:   
Vision:   
Hearing Aid:   
Jewelry:   
Clothing: Clothing:  (clothing bag placed on pt's bed in pacu) Other Valuables:   
Valuables sent to safe:

## 2018-08-28 NOTE — PROGRESS NOTES
Ortho Daily Progress Note 8/28/2018 
3:36 PM 
 
POD:  Day of Surgery S/P:  Procedure(s): LEFT TOTAL HIP ARTHROPLASTY ANTERIOR APPROACH Afebrile/107/63 BP, HR 60 Became hypotensive upon standing with PT- BP 77/44 with dizziness- unable to continue Sx subsided upon laying down and BP returned to 106/64 Doing well without complaints of nausea Pain well controlled Calves soft/NTTP Bilaterally Incision OK, no drainage or dehiscence. Dressing clean and dry Thigh soft Moving lower extremities well. Feet/ankles up and down- NVI 
SCD's placed Neurocirculatory exam intact and within normal range. Lab Results Component Value Date/Time HGB 14.2 08/09/2018 08:29 AM  
 INR 1.0 08/09/2018 08:29 AM  
 
 
 
PLAN: progress diet DVT prophylaxis: SCD's,  BID WBAT with PT-mobilization- resume tomorrow- may try to sit at bedside with nursing Pain Control- Celebrex, Oxycodone Plan to D/C home- possibly tomorrow YULISSA Meier

## 2018-08-28 NOTE — PROGRESS NOTES
Problem: Mobility Impaired (Adult and Pediatric) Goal: *Acute Goals and Plan of Care (Insert Text) Physical Therapy Goals Initiated 8/28/2018 1. Patient will move from supine to sit and sit to supine  in bed with independence within 4 days. 2. Patient will perform sit to stand with modified independence within 4 days. 3. Patient will ambulate with modified independence for 150 feet with the least restrictive device within 4 days. 4. Patient will ascend/descend 14 stairs with 1 handrail(s) with modified independence within 4 days. 5 Patient will perform  home exercise program per protocol with independence within 4 days. physical Therapy EVALUATION Patient: Ashok Loco (60 y.o. male) Date: 8/28/2018 Primary Diagnosis: Arthritis of left hip [M16.12] Procedure(s) (LRB): LEFT TOTAL HIP ARTHROPLASTY ANTERIOR APPROACH (Left) Day of Surgery Precautions:   WBAT, Total hip, avoid sudden and extreme movements of left hip ASSESSMENT : 
Based on the objective data described below, the patient presents with decreased activity tolerance due to orthostatic hypotension with associated lightheadedness and impaired functional mobility below his baseline level of independence. Pt reports he ambulates independently and denies any falls in the last year. Pt was provided with education regarding importance of avoiding sudden or extreme movements of his left hip. Pt performed bed mobility and sit to stand with minimal assist x 2. Pt reported lightheadedness with sitting and increased with standing with  BP dropping to  72/44. Pt was assisted back to bed with minimal assist x 2 and reported symptoms resolving. RN aware. Anticipate pt will progress well once hemodynamically stable. Pt plans to return home with his wife who is available to assist 24/7. Recommend home health PT. Patient will benefit from skilled intervention to address the above impairments. Patients rehabilitation potential is considered to be Good Factors which may influence rehabilitation potential include:  
[x]         None noted 
[]         Mental ability/status []         Medical condition 
[]         Home/family situation and support systems 
[]         Safety awareness 
[]         Pain tolerance/management 
[]         Other: PLAN : 
Recommendations and Planned Interventions: 
[x]           Bed Mobility Training             []    Neuromuscular Re-Education 
[x]           Transfer Training                   []    Orthotic/Prosthetic Training 
[x]           Gait Training                         []    Modalities [x]           Therapeutic Exercises           []    Edema Management/Control 
[x]           Therapeutic Activities            [x]    Patient and Family Training/Education 
[]           Other (comment): Frequency/Duration: Patient will be followed by physical therapy  twice daily to address goals. Discharge Recommendations: Home Health Further Equipment Recommendations for Discharge: owns a rolling walker SUBJECTIVE:  
Patient stated I think I need to sit down(lightheaded).  OBJECTIVE DATA SUMMARY:  
HISTORY:   
Past Medical History:  
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  
Procedure Laterality Date [address] left hip PIN PLACED [address] REMOVAL OF SCREWS  
 HX VASECTOMY  2012 Prior Level of Function/Home Situation: ambulates independently, denies falls in the last year Personal factors and/or comorbidities impacting plan of care:  
 
Home Situation Home Environment: Private residence # Steps to Enter: 5 Rails to Enter: Yes Hand Rails : Bilateral 
One/Two Story Residence: Two story # of Interior Steps: 15 Interior Rails: Left Living Alone: No 
 Support Systems: Spouse/Significant Other/Partner Patient Expects to be Discharged to[de-identified] Private residence Current DME Used/Available at Home: Walker, rolling, Cane, straight, Raised toilet seat Tub or Shower Type: Shower EXAMINATION/PRESENTATION/DECISION MAKING:  
Critical Behavior: 
Neurologic State: Alert, Appropriate for age Orientation Level: Oriented X4 Cognition: Appropriate decision making, Appropriate for age attention/concentration, Appropriate safety awareness Safety/Judgement: Insight into deficits Hearing: Auditory Auditory Impairment: None Skin:  Dressing intact Range Of Motion: 
 within functional limits Strength:   
 within functional limits Tone & Sensation:  
 intact Coordination: 
 intact Functional Mobility: 
Bed Mobility: 
  
Supine to Sit: Additional time;Assist x2;Minimum assistance Sit to Supine: Minimum assistance;Assist x2; Additional time Scooting: Assist x1;Minimum assistance; Additional time Transfers: 
Sit to Stand: Additional time;Assist x2;Minimum assistance Stand to Sit: Assist x2;Contact guard assistance; Additional time Pt stood briefly due to orthostatic hypotension, assisted back to bed Balance:  
Sitting: Intact Standing: Intact; With support Ambulation/Gait Training: 
 deferred due to orthostatic hypotension Functional Measure: 
Tinetti test: 
 
Sitting Balance: 1 Arises: 1 Attempts to Rise: 1 Immediate Standing Balance: 1 Standing Balance: 1 Nudged: 0 Eyes Closed: 0 Turn 360 Degrees - Continuous/Discontinuous: 0 Turn 360 Degrees - Steady/Unsteady: 0 Sitting Down: 2 Balance Score: 7 Indication of Gait: 0 
R Step Length/Height: 0 
L Step Length/Height: 0 
R Foot Clearance: 0 
L Foot Clearance: 0 Step Symmetry: 0 Step Continuity: 0 Path: 0 Trunk: 0 Walking Time: 0 Gait Score: 0 Total Score: 7 Tinetti Test and G-code impairment scale: 
Percentage of Impairment CH 
 
0% 
 CI 
 
1-19% CJ 
 
20-39% CK 
 
40-59% CL 
 
60-79% CM 
 
80-99% CN  
 
100% Tinetti Score 0-28 28 23-27 17-22 12-16 6-11 1-5 0 Tinetti Tool Score Risk of Falls 
<19 = High Fall Risk 19-24 = Moderate Fall Risk 25-28 = Low Fall Risk Tinetti ME. Performance-Oriented Assessment of Mobility Problems in Elderly Patients. [facility] 66; [id number]. (Scoring Description: PT Bulletin Feb. 10, 1993) Older adults: Renee Benavidez et al, 2009; n = [address] elderly evaluated with ABC, KAYE, ADL, and IADL) · Mean KAYE score for males aged 69-68 years = 26.21(3.40) · Mean KAYE score for females age 69-68 years = 25.16(4.30) · Mean KAYE score for males over 80 years = 23.29(6.02) · Mean KAYE score for females over 80 years = 17.20(8.32) G codes: In compliance with CMSs Claims Based Outcome Reporting, the following G-code set was chosen for this patient based on their primary functional limitation being treated: The outcome measure chosen to determine the severity of the functional limitation was the Tinetti with a score of 7/28 which was correlated with the impairment scale. ? Mobility - Walking and Moving Around:  
  - CURRENT STATUS: CL - 60%-79% impaired, limited or restricted  - GOAL STATUS: CI - 1%-19% impaired, limited or restricted  - D/C STATUS:  ---------------To be determined--------------- Physical Therapy Evaluation Charge Determination History Examination Presentation Decision-Making LOW Complexity : Zero comorbidities / personal factors that will impact the outcome / POC LOW Complexity : 1-2 Standardized tests and measures addressing body structure, function, activity limitation and / or participation in recreation  LOW Complexity : Stable, uncomplicated  LOW Complexity : FOTO score of  Based on the above components, the patient evaluation is determined to be of the following complexity level: LOW Pain: 
Pain Scale 1: Numeric (0 - 10) Pain Intensity 1: 0 Pain Location 1: Hip Activity Tolerance:  
Poor due to orthostatic hypotension with associated lightheadedness Vitals:  
 08/28/18 1515 08/28/18 1520 BP: (!) 72/44 107/63 BP 1 Location: Right arm Right arm BP Patient Position: Standing Supine; Post activity Pulse: 60 75 Resp:    
Temp:    
SpO2:    
Weight:    
Height:    
 
Please refer to the flowsheet for vital signs taken during this treatment. After treatment:  
[]         Patient left in no apparent distress sitting up in chair 
[x]         Patient left in no apparent distress in bed 
[x]         Call bell left within reach 
[]         Nursing notified 
[]         Caregiver present 
[]         Bed alarm activated COMMUNICATION/EDUCATION:  
The patients plan of care was discussed with: Registered Nurse. [x]         Fall prevention education was provided and the patient/caregiver indicated understanding. []         Patient/family have participated as able in goal setting and plan of care. [x]         Patient/family agree to work toward stated goals and plan of care. []         Patient understands intent and goals of therapy, but is neutral about his/her participation. []         Patient is unable to participate in goal setting and plan of care. Thank you for this referral. 
Sanjeev Arauz Time Calculation: 21 mins

## 2018-08-28 NOTE — OP NOTES
Name: Jono Field  MRN:  [id number]  : 1975  Age:  37 y.o. Surgery Date: 2018      OPERATIVE REPORT - LEFT TOTAL HIP CONVERSION ARTHROPLASTY -   ANTERIOR APPROACH    PREOPERATIVE DIAGNOSIS: Osteoarthritis, left hip. Previous SCFE    POSTOPERATIVE DIAGNOSIS: Osteoarthritis, left hip. SCFE    PROCEDURE PERFORMED: Left total hip Conversion arthroplasty. SURGEON: Gely Rajan MD    FIRST ASSISTANTConyaz Hooks    ANESTHESIA: General    PRE-OP ANTIBIOTIC: Ancef 2g    COMPLICATIONS: None. ESTIMATED BLOOD LOSS: 300 mL. SPECIMENS REMOVED: None. COMPONENTS IMPLANTED:   Implant Name Type Inv. Item Serial No.  Lot No. LRB No. Used Action   PLUG ACET APCL H ELIM POS STP --  - SNA  PLUG ACET APCL H ELIM POS STP --  NA [facility] [id number] Left 1 Implanted   CUP ACET SECTOR GRIPTION 54MM -- TI - SNA  CUP ACET SECTOR GRIPTION 54MM -- TI NA [facility] [id number] Left 1 Implanted   SCR BNE ACET CANC PINN 6.5X55 -- SS - SNA  SCR BNE ACET CANC PINN 6.5X55 -- SS NA [facility] [id number] Left 1 Implanted   LINER ACET PINN NEUT [id number] -- ALTRX - SNA  LINER ACET PINN NEUT [id number] -- ALTRX NA [facility] [id number] Left 1 Implanted   PLUG ACET APCL H ELIM POS STP --  - SNA  PLUG ACET APCL H ELIM POS STP --  NA [facility] [id number] Left 1 Implanted   STEM FEM TAPR SZ6 STD OFFSET -- ACTIS - SNA  STEM FEM TAPR SZ6 STD OFFSET -- ACTIS NA [facility] [employer] [id number] Left 1 Implanted   HEAD FEM CER ARTC EZ 36M+8.5 -- BIOLOX DELTA - SNA  HEAD FEM CER ARTC EZ 36M+8.5 -- BIOLOX DELTA NA [facility] [id number] Left 1 Implanted   SCR ACET CANC PINN 6.5X20MM SS --  - SNA   SCR ACET CANC PINN 6.5X20MM SS --  [facility] [id number] Left 1 Implanted       INDICATIONS: The patient is an 37 yrs male with progressive debilitating left hip and groin pain due to severe osteoarthritis.  Symptoms have progressed despite comprehensive conservative treatment and he presents for left total hip replacement. Risks, benefits, alternatives of the procedure were reviewed in detail and he desires to proceed. The patient understands the increased risk for perioperative medical complications due to medical comorbidities. DESCRIPTION OF PROCEDURE: Anesthetic was initiated. Preoperative dose of IV  antibiotic was given. Tate catheter was placed. The left side was confirmed as the operative side, prepped and draped in the usual sterile fashion. Skin was covered with Ioban occlusive dressing. Direct anterior exposure was made to the patient's hip through the sartorius tensor interval. Anterior hip vasculature was cauterized. Retractors were taken out to observe for bleeding and there was none. The capsule was identified, opened and T'd distally. The femoral neck was osteotomized. Femoral head was removed from the acetabulum, which was exposed and soft tissues were removed. The acetabulum was progressively  reamed to 53 mm and a 54 mm trial shell was impacted with good press-fit. This was removed and a 54 mm Depuy shell was impacted in the acetabulum in 40 degrees of abduction in an anatomic-type anteversion. Bone spurs were removed and 6.5 screws x2 were placed. The polyethylene liner was placed. Femur was positioned and elevated from the wound. The medullary canal was entered. Flexible reamers were utilized as the patient had a narrowed femoral canal, broached to a size 6. Calcar planed and then trialed. A 36 mm , +8.5 hip ball was the most appropriate for leg length and tension with a standard offset stem. The hip was dislocated. The anterior greater trochanter was trimmed down to enhance flexion, rotation and stability. The trial was removed and the real stem was impacted. The real hip ball was placed. The hip was reduced. After copious irrigation, the capsule was closed with #2 Vicryl sutures. I irrigated the skin, subcutaneous and deep wound.  I closed the fascia of the tensor fascia daisy with #2 Vicryl sutures. Skin and subcutaneous were irrigated. Soft tissues were infiltrated with local anesthetic. Skin and subcutaneous were closed in a standard fashion. Sterile dressing was applied. There were no complications. No specimen was sent. The procedure was a LEFT TOTAL HIP REPLACEMENT using a Depuy total hip construct. [facility] was of vital assistance throughout the duration of the procedure. The patient was transferred to the recovery room in stable condition. Previous lateral incision. Very sclerotic femur due to previous hardware.     Olesya Taylor MD

## 2018-08-28 NOTE — IP AVS SNAPSHOT
[address] [address] 
[phone] Patient: Kaylene Patricia MRN: [id number] ZRZ:[id number]/18/[id number] About your hospitalization You were admitted on:  August 28, 2018 [facility]:  August 30, 2018 Why you were hospitalized Your primary diagnosis was: Arthritis Of Left Hip Follow-up Information Follow up With Details Comments Contact Info None   None ([phone]) Patient stated that they have no PCP AT HOME CARE  For home health physical therapy. [address] [place] [address] 
[phone] Discharge Orders None A check cristofer indicates which time of day the medication should be taken. My Medications START taking these medications Instructions Each Dose to Equal  
 Morning Noon Evening Bedtime  
 aspirin delayed-release 325 mg tablet Your last dose was: Your next dose is: Take 1 Tab by mouth two (2) times a day. 325 mg  
    
   
   
   
  
 oxyCODONE IR 5 mg immediate release tablet Commonly known as:  Annalee Cazares Your last dose was: Your next dose is: Take 1-2 Tabs by mouth every four (4) hours as needed. Max Daily Amount: 60 mg.  
 5-10 mg CONTINUE taking these medications Instructions Each Dose to Equal  
 Morning Noon Evening Bedtime  
 augmented betamethasone dipropionate 0.05 % topical cream  
Commonly known as:  Abdiel Garcia Your last dose was: Your next dose is:    
   
   
 Apply  to affected area two (2) times a day. GLUCOSAMINE-CHONDROITIN PO Your last dose was: Your next dose is: Take 2,250 mg by mouth daily. 2250 mg Lavender Oil Oil Your last dose was: Your next dose is: 1 Drop by Does Not Apply route nightly. 1 Drop M-VIT PO Your last dose was: Your next dose is: Take  by mouth daily. OTHER Your last dose was: Your next dose is:    
   
   
 1 Drop nightly. VALOR OIL  
 1 Drop TYLENOL 325 mg tablet Generic drug:  acetaminophen Your last dose was: Your next dose is: Take 650 mg by mouth every four (4) hours as needed for Pain. 650 mg  
    
   
   
   
  
  
STOP taking these medications ADVIL 200 mg tablet Generic drug:  ibuprofen ADVIL PM PO  
   
  
 ALEVE 220 mg tablet Generic drug:  naproxen sodium  
   
  
 mupirocin 2 % ointment Commonly known as:  [facility] Where to Get Your Medications Information on where to get these meds will be given to you by the nurse or doctor. ! Ask your nurse or doctor about these medications  
  aspirin delayed-release 325 mg tablet  
 oxyCODONE IR 5 mg immediate release tablet Opioid Education Prescription Opioids: What You Need to Know: 
 
 
? If home health has been arranged for you the agency will contact you to arrange dates/times for visits. Please call them if you do not hear from them within 24 hours after you are discharged When to call your Orthopaedic Surgeon: Call [phone]. If you need to reach us after 5pm or on a weekend; call [phone] and the on call physician will be contacted ? Increased hip pain, unrelieved pain or if you have difficulty or are unable to walk ? Signs of infection-if your incision is red; continues to have drainage; drainage has a foul odor or if you have a persistent fever over 101 degrees ? Signs of a blood clot in your leg-calf pain, tenderness, redness, swelling of lower leg When to call your Primary Care Physician: 
? Concerns about medical conditions such as diabetes, high blood pressure, asthma, congestive heart failure ? Call if blood sugars are elevated, persistent headache or dizziness, coughing or congestion, constipation or diarrhea, burning with urination, abnormal heart rate When to call 161fnd go to the nearest emergency room ? Acute onset of chest pain, shortness of breath, difficulty breathing Activity ? Weight bearing as tolerated with walker or crutches. Refer to pages 23-33 of your handbook for instructions and pictures ? Complete your Home Exercise Program daily as instructed by your therapist.  Refer to pages 36-42 of your handbook for instructions and pictures ? Get up every one hour and walk (except at night when sleeping) ? AVOID sudden and extreme movement of your hip (surgical leg) ? Do not drive or operate heavy machinery Incision Care ? The Aquacel (brown, waterproof) surgical dressing is to remain on your hip for 7 days. On the 7th day have someone gently peel the dressing off by carefully lifting the edge and stretching it slightly to break the adhesive seal and leave incision open to air. You may take a shower with the Aquacel dressing in place.  
? You do not have staples in your hip incision; if present they will be removed by the [address] [place] [address] Tobin García staff in 10-14 days and steri-strips will be applied. Leave the steri-strips on until they fall off Preventing blood clot ? Take Aspirin as prescribed Pain management ? Take pain medication as prescribed; decrease the amount you use as your pain lessens ? Avoid alcoholic beverages while taking pain medication ? Do not take any over-the-counter medication except for Tylenol (acetaminophen) ? Please be aware that many medications contain Tylenol. We do not want you to over medicate so please read the information below as a guide. Do not take more than 3 Grams of Tylenol in a 24 hour period. (There are 1000 milligrams in one Gram) 
o 325 mg of Tylenol per tablet (do not take more than 9 tablets in 24 hours) 
o 500 mg of Tylenol per tablet (do not take more than 6 tablets in 24 hours) ? You may place an ice bag on your hip for 15-20 minutes after exercising and as needed throughout the day and night Diet Resume usual diet; drink plenty of fluids; eat foods high in fiber You may want to take a stool softener (such as Senokot-S or Colace) to prevent constipation while you are taking pain medication. If constipation occurs, take a laxative (such as Dulcolax tablets, Milk of Magnesia, or a suppository) [facility] Announcement We are excited to announce that we are making your provider's discharge notes available to you in [facility]. You will see these notes when they are completed and signed by the physician that discharged you from your recent hospital stay. If you have any questions or concerns about any information you see in [facility], please call the Health Information Department where you were seen or reach out to your Primary Care Provider for more information about your plan of care. Introducing [facility]! Dear Linda Hurtado: 
Thank you for requesting a [facility] account.   Our records indicate that you already have an active [employer] account. You can access your account anytime at [email or web address] Did you know that you can access your hospital and ER discharge instructions at any time in [employer]? You can also review all of your test results from your hospital stay or ER visit. Additional Information If you have questions, please visit the Frequently Asked Questions section of the [employer] website at [email or web address] Remember, [employer] is NOT to be used for urgent needs. For medical emergencies, dial 911. Now available from your iPhone and Android! Introducing Adarsh Diaz As a [facility] patient, I wanted to make you aware of our electronic visit tool called Adarsh Diaz. [employer] allows you to connect within minutes with a medical provider 24 hours a day, seven days a week via a mobile device or tablet or logging into a secure website from your computer. You can access Adarsh Diaz from anywhere in the [place]. A virtual visit might be right for you when you have a simple condition and feel like you just dont want to get out of bed, or cant get away from work for an appointment, when your regular [facility] provider is not available (evenings, weekends or holidays), or when youre out of town and need minor care. Electronic visits cost only $49 and if the [employer] provider determines a prescription is needed to treat your condition, one can be electronically transmitted to a nearby pharmacy*. Please take a moment to enroll today if you have not already done so. The enrollment process is free and takes just a few minutes. To enroll, please download the [email or web address] christo to your tablet or phone, or visit [email or web address] to enroll on your computer.    
And, as an [address] [place] [address] patient with a [employer] account, the results of your visits will be scanned into your electronic medical record and your primary care provider will be able to view the scanned results. We urge you to continue to see your regular [facility] provider for your ongoing medical care. And while your primary care provider may not be the one available when you seek a Adarsh Chesterfin virtual visit, the peace of mind you get from getting a real diagnosis real time can be priceless. For more information on [employer], view our Frequently Asked Questions (FAQs) at [email or web address]. Sincerely, 
 
Greta Randall MD 
Chief Medical Officer [address] *:  certain medications cannot be prescribed via [employer] Providers Seen During Your Hospitalization Provider Specialty Primary office phone Anderson Patel MD Orthopedic Surgery [phone] Your Primary Care Physician (PCP) Primary Care Physician Office Phone Office Fax NONE ** None ** ** None ** You are allergic to the following Allergen Reactions Pcn (Penicillins) Rash Recent Documentation Height Weight BMI Smoking Status 1.854 m 95.2 kg 27.69 kg/m2 Never Smoker Emergency Contacts Name Discharge Info Relation Home Work Mobile [facility] DISCHARGE CAREGIVER [3] Spouse [3] [phone] Patient Belongings The following personal items are in your possession at time of discharge: 
     Visual Aid: None             Clothing:  (clothing bag placed on pt's bed in pacu) Discharge Instructions Attachments/References MEFS - OXYCODONE, RAPID RELEASE (ETH-OXYDOSE, OXY IR, ROXICODONE) - (BY MOUTH) (ENGLISH) Patient Handouts Oxycodone, Rapid Release (ETH-Oxydose, Oxy IR, Roxicodone) - (By mouth) Why this medicine is used:  
Treats moderate to severe pain. This medicine is a narcotic pain reliever. Contact a nurse or doctor right away if you have: 
· Fast or slow heart beat, shallow breathing, blue lips, skin or fingernails · Anxiety, restlessness, fever, sweating, muscle spasms, twitching, seeing or hearing things that are not there · Extreme weakness, shallow breathing, slow heartbeat · Severe confusion, lightheadedness, dizziness, fainting · Sweating or cold, clammy skin, seizures · Severe constipation, stomach pain, nausea, vomiting Common side effects: · Mild constipation · Sleepiness, tiredness © 2017 [facility] Information is for End User's use only and may not be sold, redistributed or otherwise used for commercial purposes. Please provide this summary of care documentation to your next provider. Signatures-by signing, you are acknowledging that this After Visit Summary has been reviewed with you and you have received a copy. Patient Signature:  ____________________________________________________________ Date:  ____________________________________________________________  
  
Jagruit Altamirano Provider Signature:  ____________________________________________________________ Date:  ____________________________________________________________

## 2018-08-28 NOTE — ANESTHESIA PREPROCEDURE EVALUATION
Anesthetic History   No history of anesthetic complications            Review of Systems / Medical History  Patient summary reviewed, nursing notes reviewed and pertinent labs reviewed    Pulmonary  Within defined limits                 Neuro/Psych              Cardiovascular    Hypertension (borderline/white coat)              Exercise tolerance: >4 METS     GI/Hepatic/Renal  Within defined limits              Endo/Other        Arthritis     Other Findings              Physical Exam    Airway  Mallampati: II  TM Distance: 4 - 6 cm  Neck ROM: normal range of motion   Mouth opening: Normal     Cardiovascular  Regular rate and rhythm,  S1 and S2 normal,  no murmur, click, rub, or gallop             Dental  No notable dental hx       Pulmonary  Breath sounds clear to auscultation               Abdominal  GI exam deferred       Other Findings            Anesthetic Plan    ASA: 2  Anesthesia type: general          Induction: Intravenous  Anesthetic plan and risks discussed with: Patient

## 2018-08-28 NOTE — PROGRESS NOTES
TRANSFER - IN REPORT: 
 
Verbal report received from Batsheva Ray RN(name) on Jus Frazier  being received from PACU(unit) for routine post - op Report consisted of patients Situation, Background, Assessment and  
Recommendations(SBAR). Information from the following report(s) SBAR, Kardex, OR Summary, Procedure Summary, Intake/Output, MAR and Recent Results was reviewed with the receiving nurse. Opportunity for questions and clarification was provided. Assessment completed upon patients arrival to unit and care assumed.

## 2018-08-29 LAB
ANION GAP SERPL CALC-SCNC: 8 MMOL/L (ref 5–15)
BUN SERPL-MCNC: 11 MG/DL (ref 6–20)
BUN/CREAT SERPL: 15 (ref 12–20)
CALCIUM SERPL-MCNC: 7.7 MG/DL (ref 8.5–10.1)
CHLORIDE SERPL-SCNC: 107 MMOL/L (ref 97–108)
CO2 SERPL-SCNC: 25 MMOL/L (ref 21–32)
CREAT SERPL-MCNC: 0.74 MG/DL (ref 0.7–1.3)
GLUCOSE SERPL-MCNC: 114 MG/DL (ref 65–100)
HGB BLD-MCNC: 10.3 G/DL (ref 12.1–17)
POTASSIUM SERPL-SCNC: 3.7 MMOL/L (ref 3.5–5.1)
SODIUM SERPL-SCNC: 140 MMOL/L (ref 136–145)

## 2018-08-29 PROCEDURE — 99218 HC RM OBSERVATION: CPT

## 2018-08-29 PROCEDURE — 85018 HEMOGLOBIN: CPT | Performed by: PHYSICIAN ASSISTANT

## 2018-08-29 PROCEDURE — 97535 SELF CARE MNGMENT TRAINING: CPT

## 2018-08-29 PROCEDURE — 80048 BASIC METABOLIC PNL TOTAL CA: CPT | Performed by: PHYSICIAN ASSISTANT

## 2018-08-29 PROCEDURE — 65270000029 HC RM PRIVATE

## 2018-08-29 PROCEDURE — 74011250637 HC RX REV CODE- 250/637: Performed by: NURSE PRACTITIONER

## 2018-08-29 PROCEDURE — 97116 GAIT TRAINING THERAPY: CPT | Performed by: PHYSICAL THERAPIST

## 2018-08-29 PROCEDURE — 97530 THERAPEUTIC ACTIVITIES: CPT | Performed by: PHYSICAL THERAPIST

## 2018-08-29 PROCEDURE — 74011250637 HC RX REV CODE- 250/637: Performed by: PHYSICIAN ASSISTANT

## 2018-08-29 PROCEDURE — 74011250636 HC RX REV CODE- 250/636: Performed by: PHYSICIAN ASSISTANT

## 2018-08-29 PROCEDURE — 36415 COLL VENOUS BLD VENIPUNCTURE: CPT | Performed by: PHYSICIAN ASSISTANT

## 2018-08-29 PROCEDURE — 97165 OT EVAL LOW COMPLEX 30 MIN: CPT

## 2018-08-29 RX ORDER — OXYCODONE HYDROCHLORIDE 5 MG/1
5-10 TABLET ORAL
Qty: 80 TAB | Refills: 0 | Status: SHIPPED | OUTPATIENT
Start: 2018-08-29

## 2018-08-29 RX ORDER — ASPIRIN 325 MG
325 TABLET, DELAYED RELEASE (ENTERIC COATED) ORAL 2 TIMES DAILY
Qty: 1 TAB | Refills: 0 | Status: SHIPPED
Start: 2018-08-29

## 2018-08-29 RX ORDER — OXYCODONE HYDROCHLORIDE 5 MG/1
5 TABLET ORAL
Status: DISCONTINUED | OUTPATIENT
Start: 2018-08-29 | End: 2018-08-30 | Stop reason: HOSPADM

## 2018-08-29 RX ORDER — ONDANSETRON 4 MG/1
4 TABLET, ORALLY DISINTEGRATING ORAL
Status: DISCONTINUED | OUTPATIENT
Start: 2018-08-29 | End: 2018-08-30 | Stop reason: HOSPADM

## 2018-08-29 RX ADMIN — SODIUM CHLORIDE 125 ML/HR: 900 INJECTION, SOLUTION INTRAVENOUS at 04:11

## 2018-08-29 RX ADMIN — CELECOXIB 200 MG: 200 CAPSULE ORAL at 18:11

## 2018-08-29 RX ADMIN — Medication 1 TABLET: at 10:44

## 2018-08-29 RX ADMIN — Medication 10 ML: at 14:00

## 2018-08-29 RX ADMIN — OXYCODONE HYDROCHLORIDE 5 MG: 5 TABLET ORAL at 20:27

## 2018-08-29 RX ADMIN — ACETAMINOPHEN 500 MG TABLET 1000 MG: at 18:11

## 2018-08-29 RX ADMIN — CELECOXIB 200 MG: 200 CAPSULE ORAL at 10:44

## 2018-08-29 RX ADMIN — Medication 2 G: at 00:31

## 2018-08-29 RX ADMIN — ACETAMINOPHEN 500 MG TABLET 1000 MG: at 07:23

## 2018-08-29 RX ADMIN — OXYCODONE HYDROCHLORIDE 5 MG: 5 TABLET ORAL at 12:06

## 2018-08-29 RX ADMIN — OXYCODONE HYDROCHLORIDE 5 MG: 5 TABLET ORAL at 04:11

## 2018-08-29 RX ADMIN — Medication 1 TABLET: at 18:11

## 2018-08-29 RX ADMIN — ASPIRIN 325 MG: 325 TABLET, DELAYED RELEASE ORAL at 10:44

## 2018-08-29 RX ADMIN — ACETAMINOPHEN 500 MG TABLET 1000 MG: at 00:31

## 2018-08-29 RX ADMIN — OXYCODONE HYDROCHLORIDE 5 MG: 5 TABLET ORAL at 15:53

## 2018-08-29 RX ADMIN — ACETAMINOPHEN 500 MG TABLET 1000 MG: at 12:06

## 2018-08-29 RX ADMIN — OXYCODONE HYDROCHLORIDE 5 MG: 5 TABLET ORAL at 08:30

## 2018-08-29 RX ADMIN — ASPIRIN 325 MG: 325 TABLET, DELAYED RELEASE ORAL at 18:11

## 2018-08-29 NOTE — DISCHARGE INSTRUCTIONS
Discharge Instructions Anterior Approach Hip Replacement  Dr. Cecilia Penn    Patient Name:Bereket Pena  Date of procedure:8/28/2018    Procedure:Procedure(s):  LEFT TOTAL HIP ARTHROPLASTY ANTERIOR APPROACH  Surgeon:Surgeon(s) and Role:     * Earle Valles MD - Primary   PCP: None  Date of discharge: No discharge date for patient encounter. Follow up appointments   Follow up with Dr. Cecilia Penn in 3 weeks. Call [phone] to make an appointment.  If home health has been arranged for you the agency will contact you to arrange dates/times for visits. Please call them if you do not hear from them within 24 hours after you are discharged    When to call your Orthopaedic Surgeon: Call [phone]. If you need to reach us after 5pm or on a weekend; call [phone] and the on call physician will be contacted   Increased hip pain, unrelieved pain or if you have difficulty or are unable to walk   Signs of infection-if your incision is red; continues to have drainage; drainage has a foul odor or if you have a persistent fever over 101 degrees   Signs of a blood clot in your leg-calf pain, tenderness, redness, swelling of lower leg    When to call your Primary Care Physician:   Concerns about medical conditions such as diabetes, high blood pressure, asthma, congestive heart failure   Call if blood sugars are elevated, persistent headache or dizziness, coughing or congestion, constipation or diarrhea, burning with urination, abnormal heart rate    When to call [id number] go to the nearest emergency room   Acute onset of chest pain, shortness of breath, difficulty breathing    Activity   Weight bearing as tolerated with walker or crutches.  Refer to pages 23-33 of your handbook for instructions and pictures   Complete your Home Exercise Program daily as instructed by your therapist.  Refer to pages 36-42 of your handbook for instructions and pictures   Get up every one hour and walk (except at night when sleeping)   AVOID sudden and extreme movement of your hip (surgical leg)   Do not drive or operate heavy machinery    Incision Care     The Aquacel (brown, waterproof) surgical dressing is to remain on your hip for 7 days. On the 7th day have someone gently peel the dressing off by carefully lifting the edge and stretching it slightly to break the adhesive seal and leave incision open to air. You may take a shower with the Aquacel dressing in place.  You do not have staples in your hip incision; if present they will be removed by the Home Health staff in 10-14 days and steri-strips will be applied. Leave the steri-strips on until they fall off    Preventing blood clot   Take Aspirin as prescribed    Pain management   Take pain medication as prescribed; decrease the amount you use as your pain lessens   Avoid alcoholic beverages while taking pain medication   Do not take any over-the-counter medication except for Tylenol (acetaminophen)   Please be aware that many medications contain Tylenol. We do not want you to over medicate so please read the information below as a guide. Do not take more than 3 Grams of Tylenol in a 24 hour period. (There are 1000 milligrams in one Gram)  o 325 mg of Tylenol per tablet (do not take more than 9 tablets in 24 hours)  o 500 mg of Tylenol per tablet (do not take more than 6 tablets in 24 hours)   You may place an ice bag on your hip for 15-20 minutes after exercising and as needed throughout the day and night    Diet  Resume usual diet; drink plenty of fluids; eat foods high in fiber  You may want to take a stool softener (such as Senokot-S or Colace) to prevent constipation while you are taking pain medication.   If constipation occurs, take a laxative (such as Dulcolax tablets, Milk of Magnesia, or a suppository)

## 2018-08-29 NOTE — PROGRESS NOTES
Problem: Self Care Deficits Care Plan (Adult) Goal: *Acute Goals and Plan of Care (Insert Text) Occupational Therapy Goals Initiated 8/29/2018 1. Patient will perform lower body ADLs with AE supervision/set-up within 4 day(s). 2.  Patient will perform upper body ADLs standing 5 mins without fatigue or LOB with supervision/set-up within 4 day(s). 3.  Patient will perform toilet transfer with supervision/set-up within 4 day(s). 4.  Patient will perform all aspects of toileting with supervision/set-up within 4 day(s). 5.  Patient will participate in upper extremity therapeutic exercise/activities with supervision/set-up for 10 minutes within 4 day(s). 6.  Patient will utilize energy conservation techniques during functional activities without cues within 4 day(s). Occupational Therapy EVALUATION Patient: Werner Najjar (49 y.o. male) Date: 8/29/2018 Primary Diagnosis: Arthritis of left hip [M16.12] Procedure(s) (LRB): LEFT TOTAL HIP ARTHROPLASTY ANTERIOR APPROACH (Left) 1 Day Post-Op Precautions:  WBAT, Total hip- anterior ASSESSMENT : 
Based on the objective data described below, the patient presents with impaired activity tolerance with decrease in BP and symptomatic with standing limiting his ability to complete ADL tasks. Nursing cleared for therapy. Patient motivated for therapy. He lives with wife and is indep with ADL tasks at baseline, working full time. Provided ed on avoiding extreme motions with ADL tasks with good understanding. Provided ed on bed mobility, home safety, LB dressing aides/techniques and verbalized toileting/shower transfers. Completed bed mob with min A, LB bathing with min A with CHG wipes for LLE, sit <> stand with min A, and LB dressing with AD with min A for sit <> stand aspect. Unable to progress to toileting tasks as patient with decrease in BP with standing, symptomatic returning to supine with slow increase in BP.    If patient remains in hospital tomorrow recommend progression to toileting training. Do not anticipate any difficulties with ADL tasks as BP stabilizes with patient's progression with PT services. Patient will benefit from skilled intervention to address the above impairments. Patients rehabilitation potential is considered to be Good Factors which may influence rehabilitation potential include:  
[]                None noted []                Mental ability/status [x]                Medical condition []                Home/family situation and support systems []                Safety awareness []                Pain tolerance/management 
[]                Other: PLAN : 
Recommendations and Planned Interventions: 
[x]                  Self Care Training                  [x]           Therapeutic Activities [x]                  Functional Mobility Training    []           Cognitive Retraining 
[x]                  Therapeutic Exercises           [x]           Endurance Activities [x]                  Balance Training                   []           Neuromuscular Re-Education []                  Visual/Perceptual Training     [x]      Home Safety Training 
[x]                  Patient Education                 [x]           Family Training/Education []                  Other (comment): Frequency/Duration: Patient will be followed by occupational therapy 5 times a week to address goals. Discharge Recommendations: None Further Equipment Recommendations for Discharge: has all needed DME- will need RW height adjusted SUBJECTIVE:  
Patient stated Darius Menon went to the class.  OBJECTIVE DATA SUMMARY:  
HISTORY:  
Past Medical History:  
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  
Pro[address] Laterality Date [address] hip PIN PLACED [facility] [facility] REMOVAL OF SCREWS  
 HX VASECTOMY  2012 Prior Level of Function/Environment/Context: Home with wife and teenage children. INdep ADL tasks and IADLs, driving and works full time. Occupations in which the patient is/was successful, what are the barriers preventing that success:  
Performance Patterns (routines, roles, habits, and rituals):  
Personal Interests and/or values:  
Expanded or extensive additional review of patient history:  
 
Home Situation Home Environment: Private residence # Steps to Enter: 5 Rails to Enter: Yes Hand Rails : Bilateral 
One/Two Story Residence: Two story # of Interior Steps: 15 Interior Rails: Left Living Alone: No 
Support Systems: Spouse/Significant Other/Partner Patient Expects to be Discharged to[de-identified] Private residence Current DME Used/Available at Home: Walker, rolling, Cane, straight, Raised toilet seat Tub or Shower Type: Shower Hand dominance: Right EXAMINATION OF PERFORMANCE DEFICITS: 
Cognitive/Behavioral Status: 
Neurologic State: Appropriate for age Orientation Level: Oriented X4 Cognition: Appropriate for age attention/concentration Skin: upper visible intact Hearing: Auditory Auditory Impairment: None Vision/Perceptual:   
    
    
    
  
    
    
Corrective Lenses: Reading glasses Range of Motion: BUE: WDL Strength: 
BUE: WDL Coordination: 
  
Fine Motor Skills-Upper: Left Intact; Right Intact Gross Motor Skills-Upper: Left Intact; Right Intact Tone & Sensation: BUE WDL Balance: 
 Sitting: intact Standing: intact with support- static- limited with decrease in BP following pulling up pants Functional Mobility and Transfers for ADLs: 
Bed Mobility: 
Supine to Sit: Minimum assistance Sit to Supine: Minimum assistance Scooting: Contact guard assistance Transfers: 
Sit to Stand: Minimum assistance;Assist x1 Stand to Sit: Minimum assistance;Assist x1 
 Bed to Chair:  (did not progress) ADL Assessment: 
Feeding: Independent Oral Facial Hygiene/Grooming: Setup;Supervision (seated) Bathing: Minimum assistance Upper Body Dressing: Independent Lower Body Dressing: Minimum assistance; Adaptive equipment; Additional time Toileting: Other (comment) (not able to tolerate for eval) ADL Intervention and task modifications: 
  
Lower Body Bathing Lower Body : Minimum assistance (distal aspect- LLE) Lower Body Dressing Assistance Socks: Supervision/set-up; Compensatory technique training Shoes with Elastic Laces: Minimum assistance; Compensatory technique training Bathing: Patient instructed when bathing to not submerge wound in water, stand to shower or sponge bathe, and follow MD instructions for returning to shower. Dressing joint: Patient instructed and demonstrated to don/doff Left LE first/last verbal cues. Patient instructed and demonstrated to don all clothing while sitting prior to standing, doff all clothing to knees while standing, then sit to doff clothing off from knees to feet in order to facilitate fall prevention, pain management, and energy conservation with Supervision for seated aspect and standing. Ed on use of sock aide and reacher for socks and shoes don/doff. Has AD at home. Home safety: Patient instructed on home modifications and safety (raise height of ADL objects, appropriate height of chair surfaces, recliner safety, change of floor surfaces, clear pathways) to increase independence and fall prevention. Patient indicated understanding. Shower transfer: Patient instructed regarding when it is safe to begin transfer into tub (complete stairs with PT, advance exercises with PT high enough to clear tub height). Patient instructed to use the same technique as used with stairs when entering and exiting tub (\"up with the non-surgical, down with the surgical leg\"). Patient indicated understanding. Functional Measure: 
Barthel Index: 
 
Bathin Bladder: 10 Bowels: 10 
Groomin Dressin Feeding: 10 Mobility: 0 Stairs: 0 Toilet Use: 5 Transfer (Bed to Chair and Back): 10 Total: 50 Barthel and G-code impairment scale: 
Percentage of impairment CH 
0% CI 
1-19% CJ 
20-39% CK 
40-59% CL 
60-79% CM 
80-99% CN 
100% Barthel Score 0-100 100 99-80 79-60 59-40 20-39 1-19 
 0 Barthel Score 0-20 20 17-19 13-16 9-12 5-8 1-4 0 The Barthel ADL Index: Guidelines 1. The index should be used as a record of what a patient does, not as a record of what a patient could do. 2. The main aim is to establish degree of independence from any help, physical or verbal, however minor and for whatever reason. 3. The need for supervision renders the patient not independent. 4. A patient's performance should be established using the best available evidence. Asking the patient, friends/relatives and nurses are the usual sources, but direct observation and common sense are also important. However direct testing is not needed. 5. Usually the patient's performance over the preceding 24-48 hours is important, but occasionally longer periods will be relevant. 6. Middle categories imply that the patient supplies over 50 per cent of the effort. 7. Use of aids to be independent is allowed. Kaye Roth., Barthel, D.W. (0305). Functional evaluation: the Barthel Index. [address] [facility] (14)2. Neela Menendez shannan PJ RobertsMBONI, Ana Simmons., Elis Dyer., [place], [address] [place] [address] (). Measuring the change indisability after inpatient rehabilitation; comparison of the responsiveness of the Barthel Index and Functional [place] Measure. Journal of Neurology, Neurosurgery, and Psychiatry, 66(4), 607-032. Fanny Mckeon, [place].DAVIS, LEONIE Campos, & Elie Turner M.A. (2004.) Assessment of post-stroke quality of life in cost-effectiveness studies: The usefulness of the Barthel Index and the EuroQoL-5D.  Quality of Life Research, 13, 007-78 G codes: In compliance with CMSs Claims Based Outcome Reporting, the following G-code set was chosen for this patient based on their primary functional limitation being treated: The outcome measure chosen to determine the severity of the functional limitation was the Barthel Index with a score of 50/100 which was correlated with the impairment scale. ? Self Care:  
  - CURRENT STATUS: CK - 40%-59% impaired, limited or restricted  - GOAL STATUS: CJ - 20%-39% impaired, limited or restricted  - D/C STATUS:  ---------------To be determined--------------- Occupational Therapy Evaluation Charge Determination History Examination Decision-Making LOW Complexity : Brief history review  LOW Complexity : 1-3 performance deficits relating to physical, cognitive , or psychosocial skils that result in activity limitations and / or participation restrictions  LOW Complexity : No comorbidities that affect functional and no verbal or physical assistance needed to complete eval tasks Based on the above components, the patient evaluation is determined to be of the following complexity level: LOW Pain: 
Reports pain is within reason- had pain medication prior to session. Activity Tolerance:  
Vitals:  
 08/29/18 0700 08/29/18 0819 08/29/18 0900 08/29/18 4793 BP: 106/61  (!) 89/47 95/60 BP 1 Location: Right arm  Right arm Right arm BP Patient Position: Sitting  Standing Supine; Post activity Pulse:      
Resp:      
Temp:      
SpO2:      
Weight:  95.2 kg (209 lb 14.1 oz) Height:      
 
Please refer to the flowsheet for vital signs taken during this treatment. After treatment:  
[] Patient left in no apparent distress sitting up in chair 
[x] Patient left in no apparent distress in bed 
[x] Call bell left within reach [x] Nursing notified 
[x] Caregiver present 
[] Bed alarm activated COMMUNICATION/EDUCATION:  
 The patients plan of care was discussed with: Registered Nurse. [x]    Home safety education was provided and the patient/caregiver indicated understanding. [x]    Patient/family have participated as able in goal setting and plan of care. [x]    Patient/family agree to work toward stated goals and plan of care. []    Patient understands intent and goals of therapy, but is neutral about his/her participation. []    Patient is unable to participate in goal setting and plan of care. This patients plan of care is appropriate for delegation to [facility]. Thank you for this referral. 
Yeimy Shannon OT Time Calculation: 35 mins

## 2018-08-29 NOTE — PROGRESS NOTES
Problem: Falls - Risk of 
Goal: *Absence of Falls Document Talita Shows Fall Risk and appropriate interventions in the flowsheet. Outcome: Progressing Towards Goal 
Fall Risk Interventions: 
Mobility Interventions: Assess mobility with egress test 
 
  
 
Medication Interventions: Patient to call before getting OOB Elimination Interventions: Call light in reach, Patient to call for help with toileting needs

## 2018-08-29 NOTE — PROGRESS NOTES
Complaints: none Events: none VSS / AFEBRILE 
 
 
GEN:  NAD. AOx3 ABD:  S/NT/ND  
LLE:  Dressing C/D/I  
 5/5 motor Calf nttp (Bilat) Sensate all distribution to light touch 1+ dp/pt pulses, foot perfused Lab Results Component Value Date/Time HGB 10.3 (L) 08/29/2018 05:00 AM  
 INR 1.0 08/09/2018 08:29 AM  
 
 
Lab Results Component Value Date/Time Sodium 140 08/29/2018 05:00 AM  
 Potassium 3.7 08/29/2018 05:00 AM  
 Chloride 107 08/29/2018 05:00 AM  
 CO2 25 08/29/2018 05:00 AM  
 BUN 11 08/29/2018 05:00 AM  
 Creatinine 0.74 08/29/2018 05:00 AM  
 Calcium 7.7 (L) 08/29/2018 05:00 AM  
 
 
 
  
POD #1 LEFT TOTAL HIP REPLACEMENT. Satisfactory progress. Patient is doing well. Currently, his pain is well-controlled on oxycodone. Plan for discharge today if he meets PT goals and continues to feel well. All questions were answered, follow up in office in 3 weeks. ABX: Complete today PATHWAY: D/C B[place] per protocol DVT Prophylaxis: ASA Weight Bearing: WBAT LLE Pain Control: PRN oral narcotics, celebrex Anticipated Discharge Date: Today Disposition: Home, PT.

## 2018-08-29 NOTE — PROGRESS NOTES
Bedside and Verbal shift change report given to Alisia Robbins RN (oncoming nurse) by Coretta Carolina RN (offgoing nurse). Report included the following information SBAR, Kardex, OR Summary, Procedure Summary, Intake/Output, MAR and Recent Results.

## 2018-08-29 NOTE — PROGRESS NOTES
Problem: Mobility Impaired (Adult and Pediatric) Goal: *Acute Goals and Plan of Care (Insert Text) Physical Therapy Goals Initiated 8/28/2018 1. Patient will move from supine to sit and sit to supine  in bed with independence within 4 days. 2. Patient will perform sit to stand with modified independence within 4 days. 3. Patient will ambulate with modified independence for 150 feet with the least restrictive device within 4 days. 4. Patient will ascend/descend 14 stairs with 1 handrail(s) with modified independence within 4 days. 5 Patient will perform  home exercise program per protocol with independence within 4 days. physical Therapy TREATMENT Patient: Kathi Chandra (8 y.o. male) Date: 8/29/2018 Diagnosis: Arthritis of left hip [M16.12] Arthritis of left hip Procedure(s) (LRB): LEFT TOTAL HIP ARTHROPLASTY ANTERIOR APPROACH (Left) 1 Day Post-Op Precautions: WBAT, Total hip Chart, physical therapy assessment, plan of care and goals were reviewed. ASSESSMENT: 
Patient's initial BP in supine this afternoon was 103/67 but with sitting and standing it was higher. After ambulation he returned to the bedside chair and his BP remained good so he stayed up in the chair. He ambulated 100 feet with a RW and an antalgic gait. His wife is present and he has been eating and drinking and getting fluids to help with his BP. He anticipates steps and disch tomorrow. Not yet ready for disch this afternoon. This is the first time he has been able to sit up. Vitals:  
 08/29/18 1504 08/29/18 1505 08/29/18 1514 08/29/18 1516 BP: 110/68 115/69 124/84 135/75 BP 1 Location: Right arm Right arm Right arm Right leg BP Patient Position: Sitting Sitting Post activity Sitting Pulse: 83 Resp:      
Temp: 98.2 °F (36.8 °C) SpO2: 97% Weight:      
Height:      
 
Progression toward goals: 
[]      Improving appropriately and progressing toward goals [x]      Improving slowly and progressing toward goals 
[]      Not making progress toward goals and plan of care will be adjusted PLAN: 
Patient continues to benefit from skilled intervention to address the above impairments. Continue treatment per established plan of care. Discharge Recommendations:  Home Health Further Equipment Recommendations for Discharge:  none SUBJECTIVE:  
I am feeling hot, but I am always hot. OBJECTIVE DATA SUMMARY:  
Functional Mobility Training: 
Bed Mobility: 
  
Supine to Sit: Contact guard assistance Sit to Supine: Contact guard assistance Scooting: Independent Transfers: 
Sit to Stand: Contact guard assistance Stand to Sit: Contact guard assistance Bed to Chair:  (did not progress) Ambulation/Gait Training: 
Distance (ft): 100 Feet (ft) Assistive Device: Walker, rolling;Gait belt Ambulation - Level of Assistance: Contact guard assistance Gait Abnormalities: Antalgic;Decreased step clearance Left Side Weight Bearing: As tolerated Base of Support: Shift to right Speed/Shira: Pace decreased (<100 feet/min) Step Length: Right shortened;Left shortened Therapeutic Exercises: Ankle pumps and LAQ in sitting. Pain: 
Pain Scale 1: Numeric (0 - 10) Pain Intensity 1: 4 Pain Location 1: Hip Pain Orientation 1: Left Pain Description 1: Aching Pain Intervention(s) 1: Medication (see MAR); Distraction;Food;Family Support Activity Tolerance:  
Better this afternoon. Please refer to the flowsheet for vital signs taken during this treatment. After treatment:  
[x] Patient left in no apparent distress sitting up in chair 
[] Patient left in no apparent distress in bed 
[x] Call bell left within reach [x] Nursing notified 
[] Caregiver present 
[] Bed alarm activated COMMUNICATION/COLLABORATION:  
The patients plan of care was discussed with: Registered Nurse Martita Paula, PT 
 Time Calculation: 24 mins

## 2018-08-29 NOTE — DISCHARGE SUMMARY
@6JRBD@ [address] [place] [id number]    DISCHARGE SUMMARY     Patient: Swathi Darden                             Medical Record Number: [id number]                : 1975  Age: 37 y.o. Admit Date: 2018  Discharge Date:   Admission Diagnosis: Arthritis of left hip [M16.12]  Discharge Diagnosis: Arthritis of left hip [M16.12]  Procedures: Procedure(s):  LEFT TOTAL HIP ARTHROPLASTY ANTERIOR APPROACH  Surgeon: Richard Ryan  Assistants: Efrain  Anesthesia: general  Complications: None     History of Present Illness:  Swathi Darden is a 37 y.o. male with a history of Left hip pain, swelling, and marked loss of function. Despite conservative management and after clinical and radiographic evaluation, it was determined that he suffered from end-stage osteoarthritis and would benefit from Procedure(s):  LEFT TOTAL HIP ARTHROPLASTY ANTERIOR APPROACH, which he consented to undergo after a discussion of the risks, benefits, alternatives, rehab concerns, and potential complications of surgery. Hospital Course:  [place] Pena tolerated the procedure well. He was transferred  to the recovery room in stable condition. After a brief stay the patient was then transferred to the Joint Replacement Unit at [address] [place] [address].  On postoperative day #1, the dressing was clean and dry, he was neurovascularly intact. The patient was afebrile and vital signs were stable. Calves were soft and non-tender bilaterally. On postoperative day  # 2, the patient was tolerating a regular diet and making satisfactory progress with physical therapy. Hemoglobin and INR prior to discharge were   Lab Results   Component Value Date/Time    HGB 10.3 (L) 2018 05:00 AM    INR 1.0 2018 08:29 AM   .  Anette Loydaevan Pena made satisfactory progress with physical therapy and was discharged to Home in stable condition on postoperative day 1.   He was provided with routine postoperative instructions and advised to follow up in my office in 3 weeks following discharge from the hospital.  He was prescribed ASA for DVT prophylaxis and oxycodone for post-operative pain. Discharge Medications:  Current Discharge Medication List      START taking these medications    Details   aspirin delayed-release 325 mg tablet Take 1 Tab by mouth two (2) times a day. Qty: 1 Tab, Refills: 0      oxyCODONE IR (ROXICODONE) 5 mg immediate release tablet Take 1-2 Tabs by mouth every four (4) hours as needed. Max Daily Amount: 60 mg.  Qty: 80 Tab, Refills: 0    Associated Diagnoses: Arthritis of left hip         CONTINUE these medications which have NOT CHANGED    Details   acetaminophen (TYLENOL) 325 mg tablet Take 650 mg by mouth every four (4) hours as needed for Pain. Lavender Oil oil 1 Drop by Does Not Apply route nightly. OTHER 1 Drop nightly. VALOR OIL       GLUCOSAMINE HCL/CHONDRO RAHMAN A (GLUCOSAMINE-CHONDROITIN PO) Take 2,250 mg by mouth daily. PV W-O NORI/FERROUS FUMARATE/FA (M-VIT PO) Take  by mouth daily. augmented betamethasone dipropionate (DIPROLENE-AF) 0.05 % topical cream Apply  to affected area two (2) times a day. Qty: 15 g, Refills: 0    Associated Diagnoses: Routine general medical examination at a health care facility; Rash;  Abnormal EKG; Bradycardia         STOP taking these medications       mupirocin (BACTROBAN) 2 % ointment Comments:   Reason for Stopping:         naproxen sodium (ALEVE) 220 mg tablet Comments:   Reason for Stopping:         ibuprofen (ADVIL) 200 mg tablet Comments:   Reason for Stopping:         ibuprofen/diphenhydramine cit (ADVIL PM PO) Comments:   Reason for Stopping:               Signed by: Iris Toro MD  8/29/2018

## 2018-08-29 NOTE — PROGRESS NOTES
Problem: Mobility Impaired (Adult and Pediatric) Goal: *Acute Goals and Plan of Care (Insert Text) Physical Therapy Goals Initiated 8/28/2018 1. Patient will move from supine to sit and sit to supine  in bed with independence within 4 days. 2. Patient will perform sit to stand with modified independence within 4 days. 3. Patient will ambulate with modified independence for 150 feet with the least restrictive device within 4 days. 4. Patient will ascend/descend 14 stairs with 1 handrail(s) with modified independence within 4 days. 5 Patient will perform  home exercise program per protocol with independence within 4 days. physical Therapy TREATMENT Patient: Asha Gamino (69 y.o. male) Date: 8/29/2018 Diagnosis: Arthritis of left hip [M16.12] Arthritis of left hip Procedure(s) (LRB): LEFT TOTAL HIP ARTHROPLASTY ANTERIOR APPROACH (Left) 1 Day Post-Op Precautions: WBAT, Total hip Chart, physical therapy assessment, plan of care and goals were reviewed. ASSESSMENT: 
Patient continues to be orthostatic. He is able to come to sitting with CGA for the LLE. He stood and BP was ok but after ambulating 100 feet and being up for over 5 minutes on returning to the room his BP was 92/66 so he returned to supine. He has a slow antalgic gait with the RW . He is steady with decreased WB LLE. He has 5 steps into the house and a full flight to the bedroom so he is not yet ready for disch. Progression toward goals:  
 
Vitals:  
 08/29/18 1211 08/29/18 1223 08/29/18 1224 08/29/18 1225 BP: 135/72 117/61 92/66 120/72 BP 1 Location:      
BP Patient Position: Sitting Standing Post activity Supine Pulse:      
Resp:      
Temp:      
SpO2:      
Weight:      
Height:      
 
[]      Improving appropriately and progressing toward goals [x]      Improving slowly and progressing toward goals 
[]      Not making progress toward goals and plan of care will be adjusted PLAN: 
 Patient continues to benefit from skilled intervention to address the above impairments. Continue treatment per established plan of care. Discharge Recommendations:  Home Health Further Equipment Recommendations for Discharge:  None. Has a RW SUBJECTIVE:  
Patient stated I am feeling a little dizzy.  OBJECTIVE DATA SUMMARY:  
Critical Behavior: 
Neurologic State: Appropriate for age Orientation Level: Oriented X4 Cognition: Appropriate for age attention/concentration Safety/Judgement: Insight into deficits Functional Mobility Training: 
Bed Mobility: 
  
Supine to Sit: Contact guard assistance Sit to Supine: Contact guard assistance Scooting: Independent Transfers: 
Sit to Stand: Contact guard assistance Stand to Sit: Contact guard assistance Bed to Chair:  (did not progress) Balance: 
Sitting: Intact Standing: Intact; With support Ambulation/Gait Training: 
Distance (ft): 100 Feet (ft) Assistive Device: Walker, rolling;Gait belt Ambulation - Level of Assistance: Contact guard assistance Gait Abnormalities: Antalgic;Decreased step clearance Left Side Weight Bearing: As tolerated Base of Support: Shift to right Speed/Shira: Pace decreased (<100 feet/min) Step Length: Right shortened;Left shortened Pain: 
Pain Scale 1: Numeric (0 - 10) Pain Intensity 1: 4 Pain Location 1: Hip Pain Orientation 1: Left Pain Description 1: Aching Pain Intervention(s) 1: Medication (see MAR); Distraction;Food;Family Support Activity Tolerance:  
orthostatic Please refer to the flowsheet for vital signs taken during this treatment. After treatment: ice to hip 
[] Patient left in no apparent distress sitting up in chair 
[x] Patient left in no apparent distress in bed 
[x] Call bell left within reach [x] Nursing notified 
[x] Caregiver present 
[] Bed alarm activated COMMUNICATION/COLLABORATION:  
 The patients plan of care was discussed with: Registered Nurse Alysha Oleary, PT Time Calculation: 30 mins

## 2018-08-29 NOTE — PROGRESS NOTES
5:22 PM 
Bedside and Verbal shift change report given to Nadja Bajwa RN (oncoming nurse) by Landon Castillo RN (offgoing nurse). Report included the following information SBAR, Kardex, OR Summary, Intake/Output, MAR and Recent Results.

## 2018-08-29 NOTE — PROGRESS NOTES
Care Management Interventions PCP Verified by CM: No (patient has no PCP. CM provided patient with list of [employer] PCPs) Palliative Care Criteria Met (RRAT>21 & CHF Dx)?: No 
Mode of Transport at Discharge: Other (see comment) (family) Transition of Care Consult (CM Consult): Home Health, Discharge Planning [address]: No 
Reason Outside [place]: Physician referred to specific agency (patient was contacted by Eliezer le[facility]) MyChart Signup: No 
Discharge Durable Medical Equipment: No 
Health Maintenance Reviewed: Yes Physical Therapy Consult: Yes Occupational Therapy Consult: Yes Speech Therapy Consult: No 
Current Support Network: Lives with Spouse, Own Home Confirm Follow Up Transport: Family Plan discussed with Pt/Family/Caregiver: Yes Freedom of Choice Offered: Yes [employer] Information Provided?: No 
Discharge Location Discharge Placement: Home with home health Reason for Admission:   LEFT TOTAL HIP ARTHROPLASTY ANTERIOR APPROACH  
                
RRAT Score:  5 Plan for utilizing home health:  Offered freedom of choice, patient prefers [facility]. Referral sent. They have accepted patient. Likelihood of Readmission:  Low. Transition of Care Plan:    Home with home health. JAIR ChiuW/CRM

## 2018-08-29 NOTE — PROGRESS NOTES
Problem: Discharge Planning Goal: *Discharge to safe environment Outcome: Progressing Towards Goal 
Discharge disposition: home with home health. SIMON Nevarez/CRM

## 2018-08-29 NOTE — ROUTINE PROCESS
Bedside shift change report given to Yareli (oncoming nurse) by Aric Wilkins (offgoing nurse). Report included the following information SBAR.

## 2018-08-30 VITALS
DIASTOLIC BLOOD PRESSURE: 72 MMHG | OXYGEN SATURATION: 98 % | HEART RATE: 81 BPM | RESPIRATION RATE: 16 BRPM | SYSTOLIC BLOOD PRESSURE: 128 MMHG | HEIGHT: 73 IN | BODY MASS INDEX: 27.82 KG/M2 | WEIGHT: 209.88 LBS | TEMPERATURE: 98.6 F

## 2018-08-30 LAB — HGB BLD-MCNC: 10.3 G/DL (ref 12.1–17)

## 2018-08-30 PROCEDURE — 85018 HEMOGLOBIN: CPT | Performed by: PHYSICIAN ASSISTANT

## 2018-08-30 PROCEDURE — 36415 COLL VENOUS BLD VENIPUNCTURE: CPT | Performed by: PHYSICIAN ASSISTANT

## 2018-08-30 PROCEDURE — 99218 HC RM OBSERVATION: CPT

## 2018-08-30 PROCEDURE — 97116 GAIT TRAINING THERAPY: CPT

## 2018-08-30 PROCEDURE — 97535 SELF CARE MNGMENT TRAINING: CPT

## 2018-08-30 PROCEDURE — 74011250637 HC RX REV CODE- 250/637: Performed by: NURSE PRACTITIONER

## 2018-08-30 PROCEDURE — 74011250637 HC RX REV CODE- 250/637: Performed by: PHYSICIAN ASSISTANT

## 2018-08-30 RX ADMIN — Medication 5 ML: at 06:00

## 2018-08-30 RX ADMIN — OXYCODONE HYDROCHLORIDE 5 MG: 5 TABLET ORAL at 12:48

## 2018-08-30 RX ADMIN — CELECOXIB 200 MG: 200 CAPSULE ORAL at 08:06

## 2018-08-30 RX ADMIN — ACETAMINOPHEN 500 MG TABLET 1000 MG: at 12:48

## 2018-08-30 RX ADMIN — OXYCODONE HYDROCHLORIDE 5 MG: 5 TABLET ORAL at 04:18

## 2018-08-30 RX ADMIN — Medication 1 TABLET: at 08:06

## 2018-08-30 RX ADMIN — ASPIRIN 325 MG: 325 TABLET, DELAYED RELEASE ORAL at 08:06

## 2018-08-30 RX ADMIN — ACETAMINOPHEN 500 MG TABLET 1000 MG: at 06:57

## 2018-08-30 RX ADMIN — OXYCODONE HYDROCHLORIDE 5 MG: 5 TABLET ORAL at 08:07

## 2018-08-30 NOTE — PROGRESS NOTES
Complaints: none Events: low BP - resolved GEN:  NAD. AOx3 ABD:  S/NT/ND  
LLE:  Dressing C/D/I  
 5/5 motor Calf nttp (Bilat) Sensate all distribution to light touch 1+ dp/pt pulses, foot perfused Lab Results Component Value Date/Time HGB 10.3 (L) 08/30/2018 03:03 AM  
 INR 1.0 08/09/2018 08:29 AM  
 
 
 
  
POD #2 LEFT TOTAL HIP REPLACEMENT. Satisfactory progress. Patient is doing well. His pain is well-controlled and he has met PT goals. Plan for discharge today. All questions were answered; follow up in office in 3 weeks. DVT Prophylaxis: ASA Weight Bearing: WBAT RLE Pain Control: PRN oral narcotics, celebrex Anticipated Discharge Date: Today Disposition: Home, PT.

## 2018-08-30 NOTE — PROGRESS NOTES
Bedside and Verbal shift change report given to Sanjay Rojo (oncoming nurse) by Baljinder Cifuentes (offgoing nurse). Report included the following information SBAR, Kardex, Intake/Output, MAR and Recent Results.

## 2018-08-30 NOTE — PROGRESS NOTES
Problem: Mobility Impaired (Adult and Pediatric) Goal: *Acute Goals and Plan of Care (Insert Text) Physical Therapy Goals Initiated 8/28/2018 1. Patient will move from supine to sit and sit to supine  in bed with independence within 4 days. 2. Patient will perform sit to stand with modified independence within 4 days. 3. Patient will ambulate with modified independence for 150 feet with the least restrictive device within 4 days. 4. Patient will ascend/descend 14 stairs with 1 handrail(s) with modified independence within 4 days. 5 Patient will perform  home exercise program per protocol with independence within 4 days. physical Therapy TREATMENT Patient: Atul Browne (79 y.o. male) Date: 8/30/2018 Diagnosis: Arthritis of left hip [M16.12] Arthritis of left hip Procedure(s) (LRB): LEFT TOTAL HIP ARTHROPLASTY ANTERIOR APPROACH (Left) 2 Days Post-Op Precautions: WBAT, Total hip Chart, physical therapy assessment, plan of care and goals were reviewed. ASSESSMENT: 
Pt received sitting EOB, agreeable to PT. Pt SBA for sit<>stand transfers to RW. BP stable in 120's/70's before/after activity. Pt completed stair training x 13 steps using left rail and SPC (CGA). Pt w/ good coordination and safety on steps. Amb approx 200 FT (RW,SBA). Gait progressed to reciprocal, step through gait. 2-3/10 pain throughout session. Reviewed HEP, activity pacing, and position changes. Pt and pt wife w/ no additional questions/concerns. Pt seated at EOB as he reports high/hip chair is uncomfortable. Pt cleared for d/c home w/ HHPT-RN aware. Progression toward goals: 
[x]      Improving appropriately and progressing toward goals 
[]      Improving slowly and progressing toward goals 
[]      Not making progress toward goals and plan of care will be adjusted PLAN: 
Patient continues to benefit from skilled intervention to address the above impairments. Continue treatment per established plan of care. Discharge Recommendations:  Home Health Further Equipment Recommendations for Discharge:  Has RW SUBJECTIVE:  
Patient stated \" I'm a lot more clearer in the head today.  OBJECTIVE DATA SUMMARY:  
Critical Behavior: 
Neurologic State: Alert Orientation Level: Oriented X4 Cognition: Appropriate decision making, Appropriate for age attention/concentration, Appropriate safety awareness Safety/Judgement: Insight into deficits Functional Mobility Training: 
Bed Mobility: 
  
Supine to Sit:  (pt seated EOB upon arrival) Sit to Supine:  (returned to seated EOB) Scooting: Independent Transfers: 
Sit to Stand: Stand-by assistance Stand to Sit: Stand-by assistance Balance: 
Sitting: Intact Standing: Intact; With support Ambulation/Gait Training: 
Distance (ft): 200 Feet (ft) Assistive Device: Gait belt;Walker, rolling Ambulation - Level of Assistance: Contact guard assistance Gait Abnormalities: Antalgic;Decreased step clearance Base of Support: Narrowed; Shift to right Speed/Shira: Pace decreased (<100 feet/min) Step Length: Left shortened;Right shortened Stairs: 
Number of Stairs Trained: 13 Stairs - Level of Assistance: Contact guard assistance; Adaptive equipment Rail Use: Left  (SPC in opposite hand) Pain: 
Pain Scale 1: Numeric (0 - 10) Pain Intensity 1: 4 Pain Location 1: Hip Pain Orientation 1: Left Pain Description 1: Aching Pain Intervention(s) 1: Medication (see MAR); Family Support;Distraction Activity Tolerance:  
VSS. 2-3/10 pain w/ activity and WBing. Please refer to the flowsheet for vital signs taken during this treatment. After treatment:  
[] Patient left in no apparent distress sitting up in chair 
[x] Patient left in no apparent distress sitting edge of bed 
[x] Call bell left within reach [x] Nursing notified 
[] Caregiver present 
[] Bed alarm activated COMMUNICATION/COLLABORATION:  
 The patients plan of care was discussed with: Registered Nurse Newton Hester Time Calculation: 25 mins

## 2018-08-30 NOTE — PROGRESS NOTES
Problem: Self Care Deficits Care Plan (Adult) Goal: *Acute Goals and Plan of Care (Insert Text) Occupational Therapy Goals Initiated 8/29/2018 1. Patient will perform lower body ADLs with AE supervision/set-up within 4 day(s). 2.  Patient will perform upper body ADLs standing 5 mins without fatigue or LOB with supervision/set-up within 4 day(s). 3.  Patient will perform toilet transfer with supervision/set-up within 4 day(s). 4.  Patient will perform all aspects of toileting with supervision/set-up within 4 day(s). 5.  Patient will participate in upper extremity therapeutic exercise/activities with supervision/set-up for 10 minutes within 4 day(s). 6.  Patient will utilize energy conservation techniques during functional activities without cues within 4 day(s). Occupational Therapy TREATMENT Patient: Cristi Sierra (16 y.o. male) Date: 8/30/2018 Diagnosis: Arthritis of left hip [M16.12] Arthritis of left hip Procedure(s) (LRB): LEFT TOTAL HIP ARTHROPLASTY ANTERIOR APPROACH (Left) 2 Days Post-Op Precautions: WBAT, Total hip Chart, occupational therapy assessment, plan of care, and goals were reviewed. ASSESSMENT: 
Cleared by RN to see pt for therapy session. Pt received supine in bed, agreeable to participate. Transferred supine>sit with supervision, good sitting balance EOB. Pt already fully dressed upon therapist's arrival with assistance from wife. They report they have AE at home for pt to use, so reviewed use of all pieces from hip kit and pt participated in simulated dressing tasks. Stood with RW and ambulated to bathroom with standby assist, no unsteadiness noted during mobility or toilet transfer. Returned to sitting EOB (pt prefers this to hip chair), discussed safe shower transfers, AE for bathing, bathroom DME and bed mobility, pt and wife with no further questions for OT at end of session. Pt has all necessary equipment and will have family assist as needed at home.  Cleared by OT standpoint to return home when medically ready. Recommend home health at discharge. Progression toward goals: 
[x]          Improving appropriately and progressing toward goals 
[]          Improving slowly and progressing toward goals 
[]          Not making progress toward goals and plan of care will be adjusted PLAN: 
Patient continues to benefit from skilled intervention to address the above impairments. Continue treatment per established plan of care. Discharge Recommendations:  Home Health Further Equipment Recommendations for Discharge:  None for OT SUBJECTIVE:  
Patient stated I passed the sock aid test.\" OBJECTIVE DATA SUMMARY:  
Cognitive/Behavioral Status: 
Neurologic State: Alert Orientation Level: Oriented X4 Cognition: Follows commands Safety/Judgement: Awareness of environment, Fall prevention, Home safety Functional Mobility and Transfers for ADLs: 
Bed Mobility: 
Supine to Sit: Supervision Sit to Supine:  (returned to seated EOB) Scooting: Independent Transfers: 
Sit to Stand: Stand-by assistance (RW) Functional Transfers Toilet Transfer : Supervision; Adaptive equipment (RW, raised toilet seat) Balance: 
Sitting: Intact Standing: Intact; With support (RW) ADL Intervention and Instruction: 
  
 
Grooming Washing Hands: Supervision/set-up (standing with RW) Cognitive Retraining Safety/Judgement: Awareness of environment; Fall prevention;Home safety Bathing: Patient instructed when bathing to not submerge wound in water, stand to shower or sponge bathe, cover wound with plastic and tape to ensure no water reaches bandage/wound without cues. Patient indicated understanding. Dressing joint: Patient instructed and demonstrated to don/doff Left LE first/last with minimal cues.   Patient instructed and demonstrated to don all clothing while sitting prior to standing, doff all clothing to knees while standing, then sit to doff clothing off from knees to feet in order to facilitate fall prevention, pain management, and energy conservation with Supervision. Home safety: Patient instructed on home modifications and safety (raise height of ADL objects, appropriate height of chair surfaces, recliner safety, change of floor surfaces, clear pathways) to increase independence and fall prevention. Patient indicated understanding. Standing: Patient instructed and demonstrated to walk up to sink/counter top/surfaces, step into walker to increase safety of joint and fall prevention with Supervision. Patient educated about hip anatomy verbally and with pictures and educated to avoid internal rotation of Left LE. Instructed to apply concept to ADLs within the home (no reaching across body to Left side, square off while using objects, slide objects along surfaces). Patient instructed to increase amount of time standing, observe standing position during ADLs in order to increase even weight bearing through bilateral LEs in order to increase independence with ADLs. Goal to be reached 30 days post - op, per orthopedic surgeon or per PT. Patient indicated understanding. Tub transfer: Patient instructed regarding when it is safe to begin transfer into tub (complete stairs with PT, advance exercises with PT high enough to clear tub height). Patient instructed to use the same technique as used with stairs when entering and exiting tub (\"up with the non-surgical, down with the surgical leg\"). Patient indicated understanding. Pain: 
Pain Scale 1: Numeric (0 - 10) Pain Intensity 1: 4 Pain Location 1: Hip Pain Orientation 1: Left Pain Description 1: Aching Pain Intervention(s) 1: Medication (see MAR); Family Support;Distraction Activity Tolerance:  
Good Please refer to the flowsheet for vital signs taken during this treatment. After treatment:  
[] Patient left in no apparent distress sitting up in chair [x] Patient left in no apparent distress in bed 
[x] Call bell left within reach [x] Nursing notified 
[x] Caregiver present 
[] Bed alarm activated COMMUNICATION/COLLABORATION:  
The patients plan of care was discussed with: Registered Nurse Mayela Lazaro OT Time Calculation: 10 mins

## 2018-08-30 NOTE — ROUTINE PROCESS
.Bedside shift change report given to Shay BeckwithRN (oncoming nurse) by Kush Angela RN(offgoing nurse). Report given with SBAR.

## 2018-08-30 NOTE — PROGRESS NOTES
[id number] 
I have reviewed discharge instructions with the patient and spouse. The patient and spouse verbalized understanding. Patient declined to watch the Joint video. Patient discharged home with family, rx, and all personal belongings.

## 2018-10-15 ENCOUNTER — HOSPITAL ENCOUNTER (OUTPATIENT)
Dept: PHYSICAL THERAPY | Age: 43
Discharge: HOME | End: 2018-10-15
Payer: COMMERCIAL

## 2018-10-15 PROCEDURE — 97161 PT EVAL LOW COMPLEX 20 MIN: CPT | Performed by: PHYSICAL THERAPIST

## 2018-10-15 PROCEDURE — 97110 THERAPEUTIC EXERCISES: CPT | Performed by: PHYSICAL THERAPIST

## 2018-10-15 NOTE — PROGRESS NOTES
[employer] [facility] [address] [place], [address] [facility] Phone: [phone]  Fax: [phone] Plan of Care/Statement of Necessity for Physical Therapy Services  2-15 Patient name: Jaime Gore  : 1975  Provider#: [id number] Referral source: Lacy Payne MD     
Medical/Treatment Diagnosis: Left hip pain [M25.552] Prior Hospitalization: see medical history Comorbidities: see evaluation Prior Level of Function: see evaluation Medications: Verified on Patient Summary List 
 
Start of Care: 10/15/18      Onset Date: DOS 18 The Plan of Care and following information is based on the information from the initial evaluation. Assessment/ key information: Pt is a 37year old male presenting s/p L THR on 18. He will benefit from PT to address problem list below Evaluation Complexity History MEDIUM  Complexity : 1-2 comorbidities / personal factors will impact the outcome/ POC ; Examination LOW Complexity : 1-2 Standardized tests and measures addressing body structure, function, activity limitation and / or participation in recreation  ;Presentation LOW Complexity : Stable, uncomplicated  ;Clinical Decision Making MEDIUM Complexity : FOTO score of 26-74 Overall Complexity Rating: LOW Problem List: pain affecting function, decrease ROM, decrease strength, impaired gait/ balance, decrease ADL/ functional abilitiies, decrease activity tolerance and decrease flexibility/ joint mobility Treatment Plan may include any combination of the following: Therapeutic exercise, Therapeutic activities, Neuromuscular re-education, Physical agent/modality, Gait/balance training, Manual therapy, Patient education, Self Care training, Functional mobility training, Home safety training and Stair training Patient / Family readiness to learn indicated by: asking questions, trying to perform skills and interest 
Persons(s) to be included in education: patient (P) Barriers to Learning/Limitations: None Patient Goal (s): see evaluation Patient Self Reported Health Status: good Rehabilitation Potential: good Short Term Goals: To be accomplished in 2-3 weeks: 
1) Pt will be independent in HEP 
2) Pt will report >/=25% decrease in exacerbation of symptoms Long Term Goals: To be accomplished in 4-6 weeks: 
1) Pt will perform L SLS for >/10 sec with min to no postural sway 2) Pt will improve L hip abd strength to >/=4+/5 in order to aid in exercise 3) Pt will report being able to stand/walk for >/=45 min without inc'd symptoms 4) Pt will report being able to sleep throughout the night without being awoken from L hip discomfort 5) Pt will improve FOTO score to >/=76/100 in order to demonstrate improvement in functional mobility Frequency / Duration: Patient to be seen 1-2 times per week for 4-6 weeks. Patient/ Caregiver education and instruction: self care, activity modification and exercises 
 
[x]  Plan of care has been reviewed with JOSE Rhodes, PT 10/15/2018 11:34 AM 
 
________________________________________________________________________ I certify that the above Therapy Services are being furnished while the patient is under my care. I agree with the treatment plan and certify that this therapy is necessary. [de-identified] Signature:____________________  Date:____________Time: _________

## 2018-10-15 NOTE — PROGRESS NOTES
[facility] [address], [place], [address] Phone: [phone]  Fax: [phone] Evaluation Name:  Love Mccollum                        Age: 1975                  Gender: male Referrer:  Almas Ferguson MD                  Dx:  Left hip pain [M25.552]          Date: 10/15/2018 In time:11:35 AM  Out time:12:15 PM 
Total Treatment Time (min): 40 Total Timed Codes (min): 15 
1:1 Treatment Time (MC only): 40 Visit #: 1 Subjective: 
Current symptoms/chief complaint:  
Pt s/p L THR on 8/28/18-- anterior approach. He states he has been doing well, returned to work approx 4 weeks ago, has been walking, did some yard work yesterday. He states MD did not give him any ROM precautions. He saw MD 2 weeks ago- \"took x-rays, said everything looks great! \". He had HHPT for 2 weeks-- helped him transition from walker to cane and then to independent walking. He is active, would like to get back to hiking, softball, yardwork without symptoms. He reports he gets \"discomfort\" during the night when lying in one position for too long and had some pain through front of hip when trying to squat down to the ground while doing yardwork yesterday. He has been walking for approx 30 min 2-3x/wk. He has not had to use ice or heat the past couple weeks Aggravated by: inc'd activity Eased by:  rest 
 
Pain:   2/10 max 0/10 min 0/10 now Location of symptoms: L hip Previous treatment: n/a Diagnostic Tests: [] Lab work [x] X-rays    [] CT [] MRI     [] Other: 
Results (per report of the patient): \"all is healing well! 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 He is able to arrange his schedule for not standing or sitting for too long at a time. He is very active-- see above. Does not belong to gym but might join [employer] with his wife Prior level of function: chronic L hip pain-- had a slipped growth plate in hip as a child- had 2 pins placed. L hip gotten arthritic over the years Patient goal(s): \"develop exercises that I can do to build strength and ROM to return to active lifestyle\" Objective: 
 
Posture: Good standing, sitting posture Gait: 
Slight L>R hip drop Stairs:  
Pt able to ascend/descend 4 stairs without handrail with reciprocal gait pattern Strength (1-5) Right Left Comments Hip flexion 5 4 - Hip abd 5 4 -  
Knee flex 5 4 -  
Knee ext 5 5 - Pt able to perform 100% of supine SLR on L-- slight discomfort through ant hip Pt able to perform 100% of supine bridge. No symptoms Functional Biomechanical Screen SLS: 
R: >10 sec. No postural sway L: >10 sec. Min to mod postural sway Unilat HR on L: x10, full ROM Unilat HR on R: x10, full ROM Bilateral Squat: quad dom Palpation No TTP L ant or post hip structures Other tests/ comments:  
L>R HS tightness Outcome Measure: Patient presents with a FOTO score of 53/100.   
 
15 min Therapeutic Exercise:  [x] See flow sheet : clamshells, bridges, HS stretch with strap, standing squat, unilat HR Rationale: increase ROM, increase strength, improve coordination, improve balance and increase proprioception to improve the patients ability to justin activities such as yardwork, exercise With 
 [x] TE 
 [] TA 
 [] neuro 
 [] other: Patient Education: [x] Review HEP [] Progressed/Changed HEP based on:  
[x] positioning   [] body mechanics   [] transfers   [] heat/ice application   
[x] other: marycarmen/phys; PT POC: importance of performing HEP in order to maximize benefit of PT; encouraged pt to avoid over-exerting himself at this time; use of pillow between knees while sidelying Assessment: See POC Plan: See POC Km Portillo PT, DPT   
10/15/2018

## 2018-10-17 ENCOUNTER — HOSPITAL ENCOUNTER (OUTPATIENT)
Dept: PHYSICAL THERAPY | Age: 43
Discharge: HOME | End: 2018-10-17
Payer: COMMERCIAL

## 2018-10-17 PROCEDURE — 97110 THERAPEUTIC EXERCISES: CPT | Performed by: PHYSICAL THERAPIST

## 2018-10-17 NOTE — PROGRESS NOTES
PT DAILY TREATMENT NOTE 2-15 Patient Name: Eliot Pompa Date:10/17/2018 : 1975 [x]  Patient  Verified Payor: BLUE CROSS / Plan: [facility] PPO / Product Type: PPO / In time: 5:05 PM  Out time: 6:00 PM 
Total Treatment Time (min): 55 (45 timed) 1:1 Treatment Time (min): 30 Visit #: 2 Treatment Area: Hip pain [M25.559] SUBJECTIVE Pain Level (0-10 scale): 0/10 Any medication changes, allergies to medications, adverse drug reactions, diagnosis change, or new procedure performed?: [x] No    [] Yes (see summary sheet for update) Subjective functional status/changes:   [] No changes reported Pt reports good compliance with HEP; he iced the past two nights. He states muscle soreness through L quad- \"probably from the squats\" OBJECTIVE Modality rationale: decrease inflammation and decrease pain to improve the patients ability to return to daily activities, exercise Min Type Additional Details  
 [] Estim: []Att   []Unatt        []TENS instruct []IFC  []Premod   []NMES []Other:  []w/US   []w/ice   []w/heat Position: Location:  
 []  Traction: [] Cervical       []Lumbar 
                     [] Prone          []Supine []Intermittent   []Continuous Lbs: 
[] before manual 
[] after manual 
[]w/heat  
 []  Ultrasound: []Continuous   [] Pulsed at:  
                         []1MHz   []3MHz Location: 
W/cm2:  
 []  Paraffin Location: 
[]w/heat  
10 [x]  Ice     []  Heat 
[]  Ice massage Position: R sidelying Location: L hip  
 []  Laser 
[]  Other: Position: Location:  
 []  Vasopneumatic Device Pressure:       [] lo [] med [] hi  
Temperature:   
[x] Skin assessment post-treatment:  [x]intact []redness- no adverse reaction 
  []redness  adverse reaction:  
 
45 min Therapeutic Exercise:  [x] See flow sheet : reviewed HEP; added per flow sheet Rationale: increase ROM, increase strength, improve coordination, improve balance and increase proprioception to improve the patients ability to return to exercise, daily chores with dec'd symptoms With 
 [x] TE 
 [] TA 
 [] neuro 
 [] other: Patient Education: [x] Review HEP [] Progressed/Changed HEP based on:  
[] positioning   [] body mechanics   [] transfers   [] heat/ice application   
[] other:   
 
Other Objective/Functional Measures: n/a Pain Level (0-10 scale) post treatment: 0/10 ASSESSMENT/Changes in Function:  
Pt tolerated therapy session well-- challenged with addition of strengthening exercises. Updated HEP to include sidelying hip abduction. Patient will continue to benefit from skilled PT services to modify and progress therapeutic interventions, address functional mobility deficits, address ROM deficits, address strength deficits, analyze and cue movement patterns, analyze and modify body mechanics/ergonomics, assess and modify postural abnormalities and address imbalance/dizziness to attain remaining goals. []  See Plan of Care 
[]  See progress note/recertification 
[]  See Discharge Summary Progress towards goals / Updated goals: 
nt 
 
PLAN 
[]  Upgrade activities as tolerated     [x]  Continue plan of care 
[]  Update interventions per flow sheet      
[]  Discharge due to:_ 
[]  Other:_   
 
Bimal Michele PT 10/17/2018  5:10 PM

## 2018-10-22 ENCOUNTER — HOSPITAL ENCOUNTER (OUTPATIENT)
Dept: PHYSICAL THERAPY | Age: 43
Discharge: HOME | End: 2018-10-22
Payer: COMMERCIAL

## 2018-10-22 PROCEDURE — 97110 THERAPEUTIC EXERCISES: CPT | Performed by: PHYSICAL THERAPIST

## 2018-10-22 NOTE — PROGRESS NOTES
PT DAILY TREATMENT NOTE 2-15 Patient Name: Jaime Gore Date:10/22/2018 : 1975 [x]  Patient  Verified Payor: BLUE CROSS / Plan: [facility] PPO / Product Type: PPO / In time: 3:30 PM  Out time: 4:35 PM 
Total Treatment Time (min): 65 (55 timed) 1:1 Treatment Time (min): 40 Visit #: 3 Treatment Area: Hip pain [M25.559] SUBJECTIVE Pain Level (0-10 scale): 0/10 Any medication changes, allergies to medications, adverse drug reactions, diagnosis change, or new procedure performed?: [x] No    [] Yes (see summary sheet for update) Subjective functional status/changes:   [] No changes reported Pt reports some stiffness in hip today-- he went hiking over the weekend and slept on the ground. States he took his bands and did his exercises over the weekend He states inc'd tightness through anterior hip with standing march OBJECTIVE Modality rationale: decrease inflammation and decrease pain to improve the patients ability to return to daily activities, exercise Min Type Additional Details  
 [] Estim: []Att   []Unatt        []TENS instruct []IFC  []Premod   []NMES []Other:  []w/US   []w/ice   []w/heat Position: Location:  
 []  Traction: [] Cervical       []Lumbar 
                     [] Prone          []Supine []Intermittent   []Continuous Lbs: 
[] before manual 
[] after manual 
[]w/heat  
 []  Ultrasound: []Continuous   [] Pulsed at:  
                         []1MHz   []3MHz Location: 
W/cm2:  
 []  Paraffin Location: 
[]w/heat  
10 [x]  Ice     []  Heat 
[]  Ice massage Position: R sidelying Location: L hip  
 []  Laser 
[]  Other: Position: Location:  
 []  Vasopneumatic Device Pressure:       [] lo [] med [] hi  
Temperature:   
[x] Skin assessment post-treatment:  [x]intact []redness- no adverse reaction 
  []redness  adverse reaction: 55 min Therapeutic Exercise:  [x] See flow sheet : reviewed HEP; added per flow sheet Rationale: increase ROM, increase strength, improve coordination, improve balance and increase proprioception to improve the patients ability to return to exercise, daily chores with dec'd symptoms With 
 [x] TE 
 [] TA 
 [] neuro 
 [] other: Patient Education: [x] Review HEP [] Progressed/Changed HEP based on:  
[] positioning   [] body mechanics   [] transfers   [] heat/ice application   
[] other:   
 
Other Objective/Functional Measures: n/a Pain Level (0-10 scale) post treatment: 0/10 ASSESSMENT/Changes in Function:  
Challenged with addition of balance exercises. Progressing well Patient will continue to benefit from skilled PT services to modify and progress therapeutic interventions, address functional mobility deficits, address ROM deficits, address strength deficits, analyze and cue movement patterns, analyze and modify body mechanics/ergonomics, assess and modify postural abnormalities and address imbalance/dizziness to attain remaining goals. []  See Plan of Care 
[]  See progress note/recertification 
[]  See Discharge Summary Progress towards goals / Updated goals: 
nt 
 
PLAN 
[]  Upgrade activities as tolerated     [x]  Continue plan of care 
[]  Update interventions per flow sheet      
[]  Discharge due to:_ 
[]  Other:_   
 
Davy Mead PT 10/22/2018  3:38 PM

## 2018-10-24 ENCOUNTER — HOSPITAL ENCOUNTER (OUTPATIENT)
Dept: PHYSICAL THERAPY | Age: 43
Discharge: HOME | End: 2018-10-24
Payer: COMMERCIAL

## 2018-10-24 PROCEDURE — 97110 THERAPEUTIC EXERCISES: CPT | Performed by: PHYSICAL THERAPIST

## 2018-10-24 NOTE — PROGRESS NOTES
PT DAILY TREATMENT NOTE 2-15 Patient Name: Wilian Asa Date:10/24/2018 : 1975 [x]  Patient  Verified Payor: BLUE CROSS / Plan: [facility] PPO / Product Type: PPO / In time: 4:30 PM  Out time: 535 PM 
Total Treatment Time (min): 65 (55 timed) 1:1 Treatment Time (min): 40 Visit #: 4 Treatment Area: Hip pain [M25.559] SUBJECTIVE Pain Level (0-10 scale): 0/10 Any medication changes, allergies to medications, adverse drug reactions, diagnosis change, or new procedure performed?: [x] No    [] Yes (see summary sheet for update) Subjective functional status/changes:   [] No changes reported Pt reports that the outside of his hips have been sore since the sidestepping exercise. Doing well today OBJECTIVE Modality rationale: decrease inflammation and decrease pain to improve the patients ability to return to daily activities, exercise Type Additional Details  
[] Estim: []Att   []Unatt        []TENS instruct []IFC  []Premod   []NMES []Other:  []w/US   []w/ice   []w/heat Position: Location:  
[]  Traction: [] Cervical       []Lumbar 
                     [] Prone          []Supine []Intermittent   []Continuous Lbs: 
[] before manual 
[] after manual 
[]w/heat  
[]  Ultrasound: []Continuous   [] Pulsed at:  
                         []1MHz   []3MHz Location: 
W/cm2:  
[]  Paraffin Location: 
[]w/heat  
[x]  Ice 10 min     []  Heat 
[]  Ice massage Position: R sidelying Location: L hip  
[]  Laser 
[]  Other: Position: Location:  
[]  Vasopneumatic Device Pressure:       [] lo [] med [] hi  
Temperature:   
[x] Skin assessment post-treatment:  [x]intact []redness- no adverse reaction 
  []redness  adverse reaction:  
 
55 min Therapeutic Exercise:  [x] See flow sheet : reviewed HEP; added per flow sheet Rationale: increase ROM, increase strength, improve coordination, improve balance and increase proprioception to improve the patients ability to return to exercise, daily chores with dec'd symptoms With 
 [x] TE 
 [] TA 
 [] neuro 
 [] other: Patient Education: [x] Review HEP [] Progressed/Changed HEP based on:  
[] positioning   [] body mechanics   [] transfers   [] heat/ice application   
[] other:   
 
Other Objective/Functional Measures: n/a Pain Level (0-10 scale) post treatment: 0/10 ASSESSMENT/Changes in Function:  
Progressing well with strengthening, balance. Patient will continue to benefit from skilled PT services to modify and progress therapeutic interventions, address functional mobility deficits, address ROM deficits, address strength deficits, analyze and cue movement patterns, analyze and modify body mechanics/ergonomics, assess and modify postural abnormalities and address imbalance/dizziness to attain remaining goals. []  See Plan of Care 
[]  See progress note/recertification 
[]  See Discharge Summary Progress towards goals / Updated goals: 
nt 
 
PLAN 
[]  Upgrade activities as tolerated     [x]  Continue plan of care 
[]  Update interventions per flow sheet      
[]  Discharge due to:_ 
[]  Other:_   
 
Neha Colón, PT 10/24/2018  4:33 PM

## 2018-10-29 ENCOUNTER — HOSPITAL ENCOUNTER (OUTPATIENT)
Dept: PHYSICAL THERAPY | Age: 43
Discharge: HOME | End: 2018-10-29
Payer: COMMERCIAL

## 2018-10-29 PROCEDURE — 97110 THERAPEUTIC EXERCISES: CPT | Performed by: PHYSICAL THERAPIST

## 2018-10-29 NOTE — PROGRESS NOTES
PT DAILY TREATMENT NOTE 2-15 Patient Name: Rosalind Majano Date:10/29/2018 : 1975 [x]  Patient  Verified Payor: BLUE CROSS / Plan: [facility] PPO / Product Type: PPO / In time: 5:00 PM  Out time: 6:15 PM 
Total Treatment Time (min): 75 (65 timed) 1:1 Treatment Time (min): 60 Visit #: 7 Treatment Area: Left hip pain [M25.552] SUBJECTIVE Pain Level (0-10 scale): 0/10 Any medication changes, allergies to medications, adverse drug reactions, diagnosis change, or new procedure performed?: [x] No    [] Yes (see summary sheet for update) Subjective functional status/changes:   [] No changes reported Pt doing well; good compliance with HEP. \"The exercises have been helping\" OBJECTIVE Modality rationale: decrease inflammation and decrease pain to improve the patients ability to return to daily activities, exercise Type Additional Details  
[] Estim: []Att   []Unatt        []TENS instruct []IFC  []Premod   []NMES []Other:  []w/US   []w/ice   []w/heat Position: Location:  
[]  Traction: [] Cervical       []Lumbar 
                     [] Prone          []Supine []Intermittent   []Continuous Lbs: 
[] before manual 
[] after manual 
[]w/heat  
[]  Ultrasound: []Continuous   [] Pulsed at:  
                         []1MHz   []3MHz Location: 
W/cm2:  
[]  Paraffin Location: 
[]w/heat  
[x]  Ice 10 min     []  Heat 
[]  Ice massage Position: R sidelying Location: L hip  
[]  Laser 
[]  Other: Position: Location:  
[]  Vasopneumatic Device Pressure:       [] lo [] med [] hi  
Temperature:   
[x] Skin assessment post-treatment:  [x]intact []redness- no adverse reaction 
  []redness  adverse reaction:  
 
65 min Therapeutic Exercise:  [x] See flow sheet : reviewed HEP; added per flow sheet Rationale: increase ROM, increase strength, improve coordination, improve balance and increase proprioception to improve the patients ability to return to exercise, daily chores with dec'd symptoms With 
 [x] TE 
 [] TA 
 [] neuro 
 [] other: Patient Education: [x] Review HEP [] Progressed/Changed HEP based on:  
[] positioning   [] body mechanics   [] transfers   [] heat/ice application   
[] other:   
 
Other Objective/Functional Measures: n/a Pain Level (0-10 scale) post treatment: 0/10 ASSESSMENT/Changes in Function:  
Pt fatigued at end of visit. Progressing well. Patient will continue to benefit from skilled PT services to modify and progress therapeutic interventions, address functional mobility deficits, address ROM deficits, address strength deficits, analyze and cue movement patterns, analyze and modify body mechanics/ergonomics, assess and modify postural abnormalities and address imbalance/dizziness to attain remaining goals. []  See Plan of Care 
[]  See progress note/recertification 
[]  See Discharge Summary Progress towards goals / Updated goals: 
nt 
 
PLAN 
[]  Upgrade activities as tolerated     [x]  Continue plan of care 
[]  Update interventions per flow sheet      
[]  Discharge due to:_ 
[]  Other:_   
 
Abdifatah Small, PT 10/29/2018  5:37 PM

## 2018-10-31 ENCOUNTER — HOSPITAL ENCOUNTER (OUTPATIENT)
Dept: PHYSICAL THERAPY | Age: 43
Discharge: HOME | End: 2018-10-31
Payer: COMMERCIAL

## 2018-10-31 PROCEDURE — 97110 THERAPEUTIC EXERCISES: CPT | Performed by: PHYSICAL THERAPIST

## 2018-10-31 NOTE — PROGRESS NOTES
PT DAILY TREATMENT NOTE 2-15 Patient Name: Noah Ryan Date:10/31/2018 : 1975 [x]  Patient  Verified Payor: BLUE CROSS / Plan: [facility] PPO / Product Type: PPO / In time: 4:00 PM  Out time: 510 PM 
Total Treatment Time (min): 70 (60 timed) 1:1 Treatment Time (min): 30 Visit #: 6 Treatment Area: Left hip pain [M25.552] SUBJECTIVE Pain Level (0-10 scale): 0/10 Any medication changes, allergies to medications, adverse drug reactions, diagnosis change, or new procedure performed?: [x] No    [] Yes (see summary sheet for update) Subjective functional status/changes:   [] No changes reported Pt reports feeling good after last. He states he as a small bruise on his L hip- \"not sure where it came from, I must've bumped it on something\" He goes back to see Dr. Shanita Abdi after Thanksgiving OBJECTIVE Modality rationale: decrease inflammation and decrease pain to improve the patients ability to return to daily activities, exercise Type Additional Details  
[] Estim: []Att   []Unatt        []TENS instruct []IFC  []Premod   []NMES []Other:  []w/US   []w/ice   []w/heat Position: Location:  
[]  Traction: [] Cervical       []Lumbar 
                     [] Prone          []Supine []Intermittent   []Continuous Lbs: 
[] before manual 
[] after manual 
[]w/heat  
[]  Ultrasound: []Continuous   [] Pulsed at:  
                         []1MHz   []3MHz Location: 
W/cm2:  
[]  Paraffin Location: 
[]w/heat  
[x]  Ice 10 min     []  Heat 
[]  Ice massage Position: R sidelying Location: L hip  
[]  Laser 
[]  Other: Position: Location:  
[]  Vasopneumatic Device Pressure:       [] lo [] med [] hi  
Temperature:   
[x] Skin assessment post-treatment:  [x]intact []redness- no adverse reaction 
  []redness  adverse reaction:  
 
60 min Therapeutic Exercise:  [x] See flow sheet : reviewed HEP; added per flow sheet Rationale: increase ROM, increase strength, improve coordination, improve balance and increase proprioception to improve the patients ability to return to exercise, daily chores with dec'd symptoms With 
 [x] TE 
 [] TA 
 [] neuro 
 [] other: Patient Education: [x] Review HEP [] Progressed/Changed HEP based on:  
[] positioning   [] body mechanics   [] transfers   [] heat/ice application   
[] other:   
 
Other Objective/Functional Measures: n/a Pain Level (0-10 scale) post treatment: 0/10 ASSESSMENT/Changes in Function:  
Progressing well. Balance, strengthening improving Patient will continue to benefit from skilled PT services to modify and progress therapeutic interventions, address functional mobility deficits, address ROM deficits, address strength deficits, analyze and cue movement patterns, analyze and modify body mechanics/ergonomics, assess and modify postural abnormalities and address imbalance/dizziness to attain remaining goals. []  See Plan of Care 
[]  See progress note/recertification 
[]  See Discharge Summary Progress towards goals / Updated goals: 
nt 
 
PLAN 
[]  Upgrade activities as tolerated     [x]  Continue plan of care 
[]  Update interventions per flow sheet      
[]  Discharge due to:_ 
[]  Other:_   
 
Eusebia Norton, PT 10/31/2018  4:35 PM

## 2018-11-05 ENCOUNTER — HOSPITAL ENCOUNTER (OUTPATIENT)
Dept: PHYSICAL THERAPY | Age: 43
Discharge: HOME | End: 2018-11-05
Payer: COMMERCIAL

## 2018-11-05 PROCEDURE — 97110 THERAPEUTIC EXERCISES: CPT | Performed by: PHYSICAL THERAPIST

## 2018-11-05 NOTE — PROGRESS NOTES
PT DAILY TREATMENT NOTE 2-15 Patient Name: Milli Leo Date:2018 : 1975 [x]  Patient  Verified Payor: BLUE CROSS / Plan: [facility] PPO / Product Type: PPO / In time: 4:30 PM  Out time: 540 PM 
Total Treatment Time (min): 70 (60 timed) 1:1 Treatment Time (min): 60 Visit #: 1 Treatment Area: Left hip pain [M25.552] SUBJECTIVE Pain Level (0-10 scale): 0/10 Any medication changes, allergies to medications, adverse drug reactions, diagnosis change, or new procedure performed?: [x] No    [] Yes (see summary sheet for update) Subjective functional status/changes:   [] No changes reported Pt states good compliance with HEP; he has been doing everything he wants to do-- \"the only thing I haven't tried is running. I have tried just a 10 foot jog in the backyard but that's it\". He states he does not want to run for cardio, however he would like to play softball in the spring OBJECTIVE Modality rationale: decrease inflammation and decrease pain to improve the patients ability to return to daily activities, exercise Type Additional Details  
[] Estim: []Att   []Unatt        []TENS instruct []IFC  []Premod   []NMES []Other:  []w/US   []w/ice   []w/heat Position: Location:  
[]  Traction: [] Cervical       []Lumbar 
                     [] Prone          []Supine []Intermittent   []Continuous Lbs: 
[] before manual 
[] after manual 
[]w/heat  
[]  Ultrasound: []Continuous   [] Pulsed at:  
                         []1MHz   []3MHz Location: 
W/cm2:  
[]  Paraffin Location: 
[]w/heat  
[x]  Ice 10 min     []  Heat 
[]  Ice massage Position: R sidelying Location: L hip  
[]  Laser 
[]  Other: Position: Location:  
[]  Vasopneumatic Device Pressure:       [] lo [] med [] hi  
Temperature:   
[x] Skin assessment post-treatment:  [x]intact []redness- no adverse reaction []redness  adverse reaction:  
 
60 min Therapeutic Exercise:  [x] See flow sheet : reviewed HEP; added per flow sheet Rationale: increase ROM, increase strength, improve coordination, improve balance and increase proprioception to improve the patients ability to return to exercise, daily chores with dec'd symptoms With 
 [x] TE 
 [] TA 
 [] neuro 
 [] other: Patient Education: [x] Review HEP [] Progressed/Changed HEP based on:  
[] positioning   [] body mechanics   [] transfers   [] heat/ice application   
[] other:   
 
Other Objective/Functional Measures: n/a Pain Level (0-10 scale) post treatment: 0/10 ASSESSMENT/Changes in Function:  
justin therapy session well, progressing well with strengthening, balance activities Reassessment, possible D/C next visit Patient will continue to benefit from skilled PT services to modify and progress therapeutic interventions, address functional mobility deficits, address ROM deficits, address strength deficits, analyze and cue movement patterns, analyze and modify body mechanics/ergonomics, assess and modify postural abnormalities and address imbalance/dizziness to attain remaining goals. []  See Plan of Care 
[]  See progress note/recertification 
[]  See Discharge Summary Progress towards goals / Updated goals: 
nt 
 
PLAN 
[]  Upgrade activities as tolerated     [x]  Continue plan of care 
[]  Update interventions per flow sheet      
[]  Discharge due to:_ 
[]  Other:_   
 
Anurag Ortega PT 11/5/2018  5:03 PM

## 2018-11-07 ENCOUNTER — HOSPITAL ENCOUNTER (OUTPATIENT)
Dept: PHYSICAL THERAPY | Age: 43
Discharge: HOME | End: 2018-11-07
Payer: COMMERCIAL

## 2018-11-07 PROCEDURE — 97110 THERAPEUTIC EXERCISES: CPT | Performed by: PHYSICAL THERAPIST

## 2018-11-07 NOTE — PROGRESS NOTES
PT DAILY TREATMENT/Progress NOTE 2-15 Patient Name: Mary Fraser Date:2018 : 1975 [x]  Patient  Verified Payor: [employer]UE [employer]OSS / Plan: [facility] PPO / Product Type: PPO / In time: 4:00 PM  Out time: 5:05 PM 
Total Treatment Time (min): 65 (65 timed) 1:1 Treatment Time (min): 40 Visit #: 4 Treatment Area: Left hip pain [M25.552] SUBJECTIVE Pain Level (0-10 scale): 0/10 Any medication changes, allergies to medications, adverse drug reactions, diagnosis change, or new procedure performed?: [x] No    [] Yes (see summary sheet for update) Subjective functional status/changes:   [] No changes reported Pt reports good compliance with HEP; \"I am able to squat normally again! \". He states he has been able to do everything that he would like to get back to without L hip symptoms He is wondering whether or not he can return to golf, play softball in the spring, snow ski this winter OBJECTIVE Modality rationale: decrease inflammation and decrease pain to improve the patients ability to return to daily activities, exercise Type Additional Details  
[] Estim: []Att   []Unatt        []TENS instruct []IFC  []Premod   []NMES []Other:  []w/US   []w/ice   []w/heat Position: Location:  
[]  Traction: [] Cervical       []Lumbar 
                     [] Prone          []Supine []Intermittent   []Continuous Lbs: 
[] before manual 
[] after manual 
[]w/heat  
[]  Ultrasound: []Continuous   [] Pulsed at:  
                         []1MHz   []3MHz Location: 
W/cm2:  
[]  Paraffin Location: 
[]w/heat  
[x]  Ice- declined   []  Heat 
[]  Ice massage Position: R sidelying Location: L hip  
[]  Laser 
[]  Other: Position: Location:  
[]  Vasopneumatic Device Pressure:       [] lo [] med [] hi  
Temperature:   
[x] Skin assessment post-treatment:  [x]intact []redness- no adverse reaction []redness  adverse reaction:  
 
65 min Therapeutic Exercise:  [x] See flow sheet :reassessment performed; reviewed HEP Rationale: increase ROM, increase strength, improve coordination, improve balance and increase proprioception to improve the patients ability to return to exercise, daily chores with dec'd symptoms With 
 [x] TE 
 [] TA 
 [] neuro 
 [] other: Patient Education: [x] Review HEP [] Progressed/Changed HEP based on:  
[] positioning   [] body mechanics   [] transfers   [] heat/ice application   
[] other:   
 
Other Objective/Functional Measures: L SLS >15 sec min to no postural sway 
 
bilat squat x10, good form. No symptoms L hip flex and abd strength 5/5 FOTO= 73/100 Pain Level (0-10 scale) post treatment: 0/10 ASSESSMENT/Changes in Function:  
Pt has completed 8 skilled PT visits. He has met 2/2 STG's and 4/5 LTG's. He improved functional outcome survey by 20 points indicating improvement in functional mobility. He demonstrates improvement in L LE strength, proprioception/balance, as well as ability to perform daily activities, prolonged walking without symptoms. Pt very compliant with HEP. Strongly encouraged pt to talk with Dr. Anette Krueger regarding ski snowing this winter as well as softball in the spring-- next MD appt the week after Thanksgiving. Pt ready for D/C from PT at this time. []  See Plan of Care 
[]  See progress note/recertification 
[x]  See Discharge Summary Progress towards goals / Updated goals: 
Short Term Goals: To be accomplished in 2-3 weeks: 
1) Pt will be independent in HEP MET 2) Pt will report >/=25% decrease in exacerbation of symptoms MET 
  
Long Term Goals: To be accomplished in 4-6 weeks: 
1) Pt will perform L SLS for >/10 sec with min to no postural sway MET 
2) Pt will improve L hip abd strength to >/=4+/5 in order to aid in exercise MET 3) Pt will report being able to stand/walk for >/=45 min without inc'd symptoms MET 4) Pt will report being able to sleep throughout the night without being awoken from L hip discomfort MET 5) Pt will improve FOTO score to >/=76/100 in order to demonstrate improvement in functional mobility not met, progressing towards PLAN   [x]  Discharge due to: pt met or progressing toward set PT goals Britney Upton, PT 11/7/2018  4:17 PM

## 2018-11-20 NOTE — ANCILLARY DISCHARGE INSTRUCTIONS
[facility] [address] [place], [address] Phone: [phone]  Fax: [phone] Discharge Summary  2-15 Patient name: Rasheed Emmanuel  : 1975  Provider#:[id number] Referral source: Tee Chung MD     
Medical/Treatment Diagnosis: Left hip pain [M25.552] Prior Hospitalization: see medical history Comorbidities: see evaluation Prior Level of Function:see evaluation Medications: Verified on Patient Summary List 
 
Start of Care: 10/15/18      Onset Date:see evaluation Visits from Start of Care: 8     Missed Visits: see CC Reporting Period : 10/15/18 to 18 Summary of care: L SLS >15 sec min to no postural sway 
bilat squat x10, good form. No symptoms L hip flex and abd strength 5/5 FOTO= 73/100             
  
Pain Level (0-10 scale) post treatment: 0/10 Progress towards goals / Updated goals: 
Short Term Goals: To be accomplished in 2-3 weeks: 
1) Pt will be independent in HEP MET 2) Pt will report >/=25% decrease in exacerbation of symptoms MET 
  
Long Term Goals: To be accomplished in 4-6 weeks: 
1) Pt will perform L SLS for >/10 sec with min to no postural sway MET 
2) Pt will improve L hip abd strength to >/=4+/5 in order to aid in exercise MET 3) Pt will report being able to stand/walk for >/=45 min without inc'd symptoms MET 4) Pt will report being able to sleep throughout the night without being awoken from L hip discomfort MET 5) Pt will improve FOTO score to >/=76/100 in order to demonstrate improvement in functional mobility not met, progressing towards 
  
ASSESSMENT:  
Pt has completed 8 skilled PT visits. He has met 2/2 STG's and 4/5 LTG's. He improved functional outcome survey by 20 points indicating improvement in functional mobility.  He demonstrates improvement in L LE strength, proprioception/balance, as well as ability to perform daily activities, prolonged walking without symptoms. Pt very compliant with HEP. Strongly encouraged pt to talk with Dr. Marlene Batres regarding ski snowing this winter as well as softball in the spring-- next MD appt the week after Thanksgiving. Pt ready for D/C from PT at this time. RECOMMENDATIONS: 
[x]Discontinue therapy: [x]Patient has reached or is progressing toward set goals []Patient is non-compliant or has abdicated 
    []Due to lack of appreciable progress towards set goals Aziza Chavez, PT 11/20/2018 2:11 PM

## 2022-03-13 NOTE — ANESTHESIA POSTPROCEDURE EVALUATION
Post-Anesthesia Evaluation and Assessment Patient: Valerio Ingram MRN: [id number]  SSN: [id number] YOB: 1975  Age: 37 y.o. Sex: male Cardiovascular Function/Vital Signs Visit Vitals  /74 (BP 1 Location: Right arm, BP Patient Position: At rest)  Pulse 78  Temp 36.9 °C (98.5 °F)  Resp 16  
 Ht 6' 1\" (1.854 m)  Wt 95.3 kg (210 lb)  SpO2 96%  BMI 27.71 kg/m2 Patient is status post general anesthesia for Procedure(s): LEFT TOTAL HIP ARTHROPLASTY ANTERIOR APPROACH. Nausea/Vomiting: None Postoperative hydration reviewed and adequate. Pain: 
Pain Scale 1: Numeric (0 - 10) (08/28/18 1657) Pain Intensity 1: 4 (08/28/18 1657) Managed Neurological Status:  
Neuro (WDL): Within Defined Limits (08/28/18 1325) Neuro Neurologic State: Alert; Appropriate for age (08/28/18 1504) Orientation Level: Oriented X4 (08/28/18 1504) Cognition: Appropriate decision making; Appropriate for age attention/concentration; Appropriate safety awareness (08/28/18 1504) Speech: Clear (08/28/18 1448) LUE Motor Response: Purposeful (08/28/18 1448) LLE Motor Response: Purposeful (08/28/18 1448) RUE Motor Response: Purposeful (08/28/18 1448) RLE Motor Response: Purposeful (08/28/18 1448) At baseline Mental Status and Level of Consciousness: Arousable Pulmonary Status:  
O2 Device: Room air (08/28/18 1556) Adequate oxygenation and airway patent Complications related to anesthesia: None Post-anesthesia assessment completed. No concerns Signed By: Braulio Howell MD   
 August 28, 2018 Dr. Bethea -1st year resident

## (undated) DEVICE — Device

## (undated) DEVICE — REM POLYHESIVE ADULT PATIENT RETURN ELECTRODE: Brand: VALLEYLAB

## (undated) DEVICE — CONTAINER,SPECIMEN,3OZ,OR STRL: Brand: MEDLINE

## (undated) DEVICE — BLADE SAW W073XL276IN THK0031IN CUT THK0036IN REPL SAG

## (undated) DEVICE — SYRINGE MED 20ML STD CLR PLAS LUERLOCK TIP N CTRL DISP

## (undated) DEVICE — COVER,MAYO STAND,STERILE: Brand: MEDLINE

## (undated) DEVICE — STERILE POLYISOPRENE POWDER-FREE SURGICAL GLOVES WITH EMOLLIENT COATING: Brand: PROTEXIS

## (undated) DEVICE — SUTURE VCRL SZ 2 L54IN ABSRB UD L65MM TP-1 1/2 CIR J880T

## (undated) DEVICE — 6619 2 PTNT ISO SYS INCISE AREA&LT;(&GT;&&LT;)&GT;P: Brand: STERI-DRAPE™ IOBAN™ 2

## (undated) DEVICE — STERILE POLYISOPRENE POWDER-FREE SURGICAL GLOVES: Brand: PROTEXIS

## (undated) DEVICE — SUTURE ABSORBABLE BRAIDED 2-0 CT-1 27 IN UD VICRYL J259H

## (undated) DEVICE — T4 HOOD

## (undated) DEVICE — Z DISCONTINUED USE 2744636  DRESSING AQUACEL 14 IN ALG W3.5XL14IN POLYUR FLM CVR W/ HYDRCOLL

## (undated) DEVICE — PREP SKN PREVAIL 40ML APPL --

## (undated) DEVICE — SCRUB DRY SURG EZ SCRUB BRUSH PREOPERATIVE GRN

## (undated) DEVICE — INFECTION CONTROL KIT SYS

## (undated) DEVICE — HANDPIECE SET WITH BONE CLEANING TIP AND SUCTION TUBE: Brand: INTERPULSE

## (undated) DEVICE — ADHESIVE SKIN CLOSURE 4X44 CM PREMIERPRO EXOFINFUSION DISP

## (undated) DEVICE — SOLUTION IRRIG 1000ML H2O STRL BLT

## (undated) DEVICE — PADDING CAST SPEC 6INX4YD COT --

## (undated) DEVICE — SUTURE MCRYL SZ 4 0 L18IN ABSRB VLT PS 1 L24MM 3 8 CIR REV Y682H

## (undated) DEVICE — DRAPE XR C ARM 41X74IN LF --

## (undated) DEVICE — TRAY CATH 16F URIN MTR LTX -- CONVERT TO ITEM 363111

## (undated) DEVICE — SOLUTION IRRIG 3000ML 0.9% SOD CHL FLX CONT 0797208] ICU MEDICAL INC]

## (undated) DEVICE — SOLUTION IV 50ML 0.9% SOD CHL

## (undated) DEVICE — SUTURE MCRYL SZ 2-0 L36IN ABSRB UD L36MM CT-1 1/2 CIR Y945H

## (undated) DEVICE — 4-PORT MANIFOLD: Brand: NEPTUNE 2

## (undated) DEVICE — DRAPE,U/ SHT,SPLIT,PLAS,STERIL: Brand: MEDLINE

## (undated) DEVICE — SUTURE VCRL SZ 2-0 L27IN ABSRB UD L26MM SH 1/2 CIR J417H